# Patient Record
Sex: FEMALE | Race: WHITE | NOT HISPANIC OR LATINO | RURAL
[De-identification: names, ages, dates, MRNs, and addresses within clinical notes are randomized per-mention and may not be internally consistent; named-entity substitution may affect disease eponyms.]

---

## 2020-07-13 ENCOUNTER — HISTORICAL (OUTPATIENT)
Dept: ADMINISTRATIVE | Facility: HOSPITAL | Age: 35
End: 2020-07-13

## 2021-02-11 ENCOUNTER — HISTORICAL (OUTPATIENT)
Dept: ADMINISTRATIVE | Facility: HOSPITAL | Age: 36
End: 2021-02-11

## 2021-05-26 ENCOUNTER — OFFICE VISIT (OUTPATIENT)
Dept: OBSTETRICS AND GYNECOLOGY | Facility: CLINIC | Age: 36
End: 2021-05-26
Payer: MEDICAID

## 2021-05-26 VITALS
SYSTOLIC BLOOD PRESSURE: 130 MMHG | HEIGHT: 64 IN | DIASTOLIC BLOOD PRESSURE: 78 MMHG | WEIGHT: 185 LBS | TEMPERATURE: 98 F | OXYGEN SATURATION: 97 % | HEART RATE: 85 BPM | RESPIRATION RATE: 18 BRPM | BODY MASS INDEX: 31.58 KG/M2

## 2021-05-26 DIAGNOSIS — R10.2 PELVIC PAIN: ICD-10-CM

## 2021-05-26 DIAGNOSIS — Z12.4 ENCOUNTER FOR SCREENING FOR MALIGNANT NEOPLASM OF CERVIX: ICD-10-CM

## 2021-05-26 DIAGNOSIS — Z01.419 ENCOUNTER FOR GYNECOLOGICAL EXAMINATION WITHOUT ABNORMAL FINDING: Primary | ICD-10-CM

## 2021-05-26 DIAGNOSIS — N91.2 AMENORRHEA: ICD-10-CM

## 2021-05-26 DIAGNOSIS — F17.210 CIGARETTE SMOKER: ICD-10-CM

## 2021-05-26 DIAGNOSIS — N76.0 ACUTE VAGINITIS: ICD-10-CM

## 2021-05-26 LAB
B-HCG UR QL: NEGATIVE
CANDIDA SPECIES: NEGATIVE
CTP QC/QA: YES
GARDNERELLA: NEGATIVE
TRICHOMONAS: POSITIVE

## 2021-05-26 PROCEDURE — 99395 PREV VISIT EST AGE 18-39: CPT | Mod: 25,,, | Performed by: ADVANCED PRACTICE MIDWIFE

## 2021-05-26 PROCEDURE — 87491 CHLAMYDIA/GONORRHOEAE(GC), PCR: ICD-10-PCS | Mod: ,,, | Performed by: CLINICAL MEDICAL LABORATORY

## 2021-05-26 PROCEDURE — 87624 HUMAN PAPILLOMAVIRUS (HPV): ICD-10-PCS | Mod: ,,, | Performed by: CLINICAL MEDICAL LABORATORY

## 2021-05-26 PROCEDURE — 87660 TRICHOMONAS VAGIN DIR PROBE: CPT | Mod: ,,, | Performed by: CLINICAL MEDICAL LABORATORY

## 2021-05-26 PROCEDURE — 99395 PR PREVENTIVE VISIT,EST,18-39: ICD-10-PCS | Mod: 25,,, | Performed by: ADVANCED PRACTICE MIDWIFE

## 2021-05-26 PROCEDURE — 88142 CYTOPATH C/V THIN LAYER: CPT | Mod: GCY | Performed by: ADVANCED PRACTICE MIDWIFE

## 2021-05-26 PROCEDURE — 87480 BACTERIAL VAGINOSIS: ICD-10-PCS | Mod: ,,, | Performed by: CLINICAL MEDICAL LABORATORY

## 2021-05-26 PROCEDURE — 87660 BACTERIAL VAGINOSIS: ICD-10-PCS | Mod: ,,, | Performed by: CLINICAL MEDICAL LABORATORY

## 2021-05-26 PROCEDURE — 87491 CHLMYD TRACH DNA AMP PROBE: CPT | Mod: ,,, | Performed by: CLINICAL MEDICAL LABORATORY

## 2021-05-26 PROCEDURE — 87510 BACTERIAL VAGINOSIS: ICD-10-PCS | Mod: ,,, | Performed by: CLINICAL MEDICAL LABORATORY

## 2021-05-26 PROCEDURE — 87624 HPV HI-RISK TYP POOLED RSLT: CPT | Mod: ,,, | Performed by: CLINICAL MEDICAL LABORATORY

## 2021-05-26 PROCEDURE — 81025 POCT URINE PREGNANCY: ICD-10-PCS | Mod: ,,, | Performed by: ADVANCED PRACTICE MIDWIFE

## 2021-05-26 PROCEDURE — 96372 THER/PROPH/DIAG INJ SC/IM: CPT | Mod: ,,, | Performed by: ADVANCED PRACTICE MIDWIFE

## 2021-05-26 PROCEDURE — 87591 N.GONORRHOEAE DNA AMP PROB: CPT | Mod: ,,, | Performed by: CLINICAL MEDICAL LABORATORY

## 2021-05-26 PROCEDURE — 87591 CHLAMYDIA/GONORRHOEAE(GC), PCR: ICD-10-PCS | Mod: ,,, | Performed by: CLINICAL MEDICAL LABORATORY

## 2021-05-26 PROCEDURE — 81025 URINE PREGNANCY TEST: CPT | Mod: ,,, | Performed by: ADVANCED PRACTICE MIDWIFE

## 2021-05-26 PROCEDURE — 96372 PR INJECTION,THERAP/PROPH/DIAG2ST, IM OR SUBCUT: ICD-10-PCS | Mod: ,,, | Performed by: ADVANCED PRACTICE MIDWIFE

## 2021-05-26 PROCEDURE — 87480 CANDIDA DNA DIR PROBE: CPT | Mod: ,,, | Performed by: CLINICAL MEDICAL LABORATORY

## 2021-05-26 PROCEDURE — 87510 GARDNER VAG DNA DIR PROBE: CPT | Mod: ,,, | Performed by: CLINICAL MEDICAL LABORATORY

## 2021-05-26 RX ORDER — FLUOXETINE HYDROCHLORIDE 40 MG/1
40 CAPSULE ORAL DAILY
COMMUNITY

## 2021-05-26 RX ORDER — BREXPIPRAZOLE 4 MG/1
4 TABLET ORAL DAILY
COMMUNITY

## 2021-05-26 RX ORDER — CLONAZEPAM 0.5 MG/1
0.5 TABLET ORAL 2 TIMES DAILY
COMMUNITY

## 2021-05-26 RX ORDER — METRONIDAZOLE 500 MG/1
500 TABLET ORAL 2 TIMES DAILY
Qty: 14 TABLET | Refills: 0 | Status: SHIPPED | OUTPATIENT
Start: 2021-05-26 | End: 2021-06-09

## 2021-05-26 RX ORDER — FLUCONAZOLE 100 MG/1
100 TABLET ORAL DAILY
Qty: 3 TABLET | Refills: 0 | Status: SHIPPED | OUTPATIENT
Start: 2021-05-26 | End: 2021-05-29

## 2021-05-26 RX ORDER — AMITRIPTYLINE HYDROCHLORIDE 100 MG/1
100 TABLET ORAL NIGHTLY
COMMUNITY

## 2021-05-26 RX ORDER — DOXYCYCLINE 100 MG/1
100 CAPSULE ORAL 2 TIMES DAILY
Qty: 20 CAPSULE | Refills: 0 | Status: SHIPPED | OUTPATIENT
Start: 2021-05-26 | End: 2021-06-05

## 2021-05-26 RX ORDER — AZITHROMYCIN 500 MG/1
1000 TABLET, FILM COATED ORAL ONCE
Qty: 2 TABLET | Refills: 0 | Status: SHIPPED | OUTPATIENT
Start: 2021-05-26 | End: 2021-05-26

## 2021-05-27 LAB
CHLAMYDIA BY PCR: NEGATIVE
N. GONORRHOEAE (GC) BY PCR: NEGATIVE

## 2021-05-28 LAB
GH SERPL-MCNC: NORMAL NG/ML
INSULIN SERPL-ACNC: NORMAL U[IU]/ML
LAB AP CLINICAL INFORMATION: NORMAL
LAB AP GYN INTERPRETATION: NEGATIVE
LAB AP PAP DISCLAIMER COMMENTS: NORMAL
RENIN PLAS-CCNC: NORMAL NG/ML/H

## 2021-06-01 LAB
HPV 16: NEGATIVE
HPV 18: NEGATIVE
HPV OTHER: NEGATIVE

## 2022-01-06 ENCOUNTER — OFFICE VISIT (OUTPATIENT)
Dept: FAMILY MEDICINE | Facility: CLINIC | Age: 37
End: 2022-01-06
Payer: MEDICAID

## 2022-01-06 DIAGNOSIS — R05.8 COUGH WITH EXPOSURE TO COVID-19 VIRUS: Primary | ICD-10-CM

## 2022-01-06 DIAGNOSIS — Z20.822 COUGH WITH EXPOSURE TO COVID-19 VIRUS: Primary | ICD-10-CM

## 2022-01-06 LAB
CTP QC/QA: YES
FLUAV AG NPH QL: NEGATIVE
FLUBV AG NPH QL: NEGATIVE
SARS-COV-2 AG RESP QL IA.RAPID: POSITIVE

## 2022-01-06 PROCEDURE — 99213 OFFICE O/P EST LOW 20 MIN: CPT | Mod: GC,,, | Performed by: FAMILY MEDICINE

## 2022-01-06 PROCEDURE — 99213 PR OFFICE/OUTPT VISIT, EST, LEVL III, 20-29 MIN: ICD-10-PCS | Mod: GC,,, | Performed by: FAMILY MEDICINE

## 2022-01-06 PROCEDURE — 87428 SARSCOV & INF VIR A&B AG IA: CPT | Mod: RHCUB | Performed by: FAMILY MEDICINE

## 2022-01-06 NOTE — PROGRESS NOTES
Subjective:       Patient ID: Randee Jackson is a 36 y.o. female.    Chief Complaint: No chief complaint on file.      36 y.o. patient who presents to [facility] for COVID-19 testing.     Patients is vaccinated  no  Patient has symptoms yes  Onset: 1/3/22  Known COVID-19 exposure: yes  Tobacco use: Yes, describe: Cigarettes  PMH: None    Review of Systems   Constitutional: Positive for fatigue.   HENT: Positive for nasal congestion, sneezing and sore throat.    Eyes: Positive for discharge and redness.   Respiratory: Positive for cough.    Neurological: Positive for headaches.   All other systems reviewed and are negative.          Objective:      Physical Exam  Constitutional:       General: She is not in acute distress.     Appearance: Normal appearance.   HENT:      Nose: Nose normal.      Mouth/Throat:      Mouth: Mucous membranes are moist.   Cardiovascular:      Rate and Rhythm: Normal rate and regular rhythm.      Pulses: Normal pulses.      Heart sounds: No murmur heard.      Pulmonary:      Effort: Pulmonary effort is normal.      Breath sounds: Normal breath sounds.   Skin:     General: Skin is warm.   Neurological:      Mental Status: She is alert.            Assessment:       1. Cough with exposure to COVID-19 virus        Plan:    Patient states she tested +ve on Monday. Records not available in our system. Wants rapid test today so that can return to work on Monday. Explained that the test will be positive as she is actively symptomatic. Patient insisted we still test her.     COVID-19 TESTING  -- Rapid COVID test performed  -- patient provided with educational material   -- patient given instructions for proper home monitoring and isolation procedures  -- patient instructed to notify clinic of any changes, report to ED for evaluation of any new or worsening chest pain, shortness of breath, or other severe symptoms  -- will follow patient with telemedicine as appropriate    Recommended  quarantine/isolation: 5 days followed by 5 days of masking

## 2022-02-21 ENCOUNTER — OFFICE VISIT (OUTPATIENT)
Dept: OBSTETRICS AND GYNECOLOGY | Facility: CLINIC | Age: 37
End: 2022-02-21
Payer: MEDICAID

## 2022-02-21 VITALS
SYSTOLIC BLOOD PRESSURE: 130 MMHG | WEIGHT: 194 LBS | BODY MASS INDEX: 33.12 KG/M2 | DIASTOLIC BLOOD PRESSURE: 68 MMHG | RESPIRATION RATE: 16 BRPM | HEIGHT: 64 IN

## 2022-02-21 DIAGNOSIS — B96.89 BV (BACTERIAL VAGINOSIS): ICD-10-CM

## 2022-02-21 DIAGNOSIS — R10.2 PELVIC PAIN: Primary | ICD-10-CM

## 2022-02-21 DIAGNOSIS — N76.0 BV (BACTERIAL VAGINOSIS): ICD-10-CM

## 2022-02-21 LAB
BILIRUB SERPL-MCNC: NEGATIVE MG/DL
BLOOD URINE, POC: NEGATIVE
COLOR, POC UA: NORMAL
GARDNERELLA VAGINALIS: POSITIVE
GLUCOSE UR QL STRIP: NEGATIVE
KETONES UR QL STRIP: NEGATIVE
LEUKOCYTE ESTERASE URINE, POC: NEGATIVE
NITRITE, POC UA: NEGATIVE
PH, POC UA: NORMAL
POC BACTERIAL VAGINOSIS: POSITIVE
PROTEIN, POC: NEGATIVE
SPECIFIC GRAVITY, POC UA: NORMAL
TRICHOMONAS, POC: NEGATIVE
UROBILINOGEN, POC UA: NEGATIVE
YEAST WET PREP: NEGATIVE

## 2022-02-21 PROCEDURE — 3075F PR MOST RECENT SYSTOLIC BLOOD PRESS GE 130-139MM HG: ICD-10-PCS | Mod: CPTII,,, | Performed by: OBSTETRICS & GYNECOLOGY

## 2022-02-21 PROCEDURE — 87491 CHLAMYDIA/GONORRHOEAE(GC), PCR: ICD-10-PCS | Mod: ,,, | Performed by: CLINICAL MEDICAL LABORATORY

## 2022-02-21 PROCEDURE — 87591 CHLAMYDIA/GONORRHOEAE(GC), PCR: ICD-10-PCS | Mod: ,,, | Performed by: CLINICAL MEDICAL LABORATORY

## 2022-02-21 PROCEDURE — 81003 URINALYSIS AUTO W/O SCOPE: CPT | Mod: PBBFAC | Performed by: OBSTETRICS & GYNECOLOGY

## 2022-02-21 PROCEDURE — 3008F BODY MASS INDEX DOCD: CPT | Mod: CPTII,,, | Performed by: OBSTETRICS & GYNECOLOGY

## 2022-02-21 PROCEDURE — 99213 OFFICE O/P EST LOW 20 MIN: CPT | Mod: PBBFAC | Performed by: OBSTETRICS & GYNECOLOGY

## 2022-02-21 PROCEDURE — 3078F PR MOST RECENT DIASTOLIC BLOOD PRESSURE < 80 MM HG: ICD-10-PCS | Mod: CPTII,,, | Performed by: OBSTETRICS & GYNECOLOGY

## 2022-02-21 PROCEDURE — 87591 N.GONORRHOEAE DNA AMP PROB: CPT | Mod: ,,, | Performed by: CLINICAL MEDICAL LABORATORY

## 2022-02-21 PROCEDURE — 99214 PR OFFICE/OUTPT VISIT, EST, LEVL IV, 30-39 MIN: ICD-10-PCS | Mod: S$PBB,,, | Performed by: OBSTETRICS & GYNECOLOGY

## 2022-02-21 PROCEDURE — 3078F DIAST BP <80 MM HG: CPT | Mod: CPTII,,, | Performed by: OBSTETRICS & GYNECOLOGY

## 2022-02-21 PROCEDURE — 3008F PR BODY MASS INDEX (BMI) DOCUMENTED: ICD-10-PCS | Mod: CPTII,,, | Performed by: OBSTETRICS & GYNECOLOGY

## 2022-02-21 PROCEDURE — 87491 CHLMYD TRACH DNA AMP PROBE: CPT | Mod: ,,, | Performed by: CLINICAL MEDICAL LABORATORY

## 2022-02-21 PROCEDURE — 99214 OFFICE O/P EST MOD 30 MIN: CPT | Mod: S$PBB,,, | Performed by: OBSTETRICS & GYNECOLOGY

## 2022-02-21 PROCEDURE — 3075F SYST BP GE 130 - 139MM HG: CPT | Mod: CPTII,,, | Performed by: OBSTETRICS & GYNECOLOGY

## 2022-02-21 RX ORDER — BENZTROPINE MESYLATE 0.5 MG/1
0.5 TABLET ORAL 2 TIMES DAILY
COMMUNITY
Start: 2022-01-26

## 2022-02-21 RX ORDER — DULOXETIN HYDROCHLORIDE 60 MG/1
CAPSULE, DELAYED RELEASE ORAL
COMMUNITY
Start: 2022-02-10

## 2022-02-21 RX ORDER — RISPERIDONE 1 MG/1
1 TABLET ORAL 2 TIMES DAILY
COMMUNITY
Start: 2022-01-26

## 2022-02-22 LAB
CHLAMYDIA BY PCR: NEGATIVE
N. GONORRHOEAE (GC) BY PCR: NEGATIVE

## 2022-03-03 ENCOUNTER — OFFICE VISIT (OUTPATIENT)
Dept: OBSTETRICS AND GYNECOLOGY | Facility: CLINIC | Age: 37
End: 2022-03-03
Payer: MEDICAID

## 2022-03-03 ENCOUNTER — HOSPITAL ENCOUNTER (OUTPATIENT)
Dept: RADIOLOGY | Facility: HOSPITAL | Age: 37
Discharge: HOME | End: 2022-03-03
Attending: OBSTETRICS & GYNECOLOGY
Payer: MEDICAID

## 2022-03-03 DIAGNOSIS — R10.2 PELVIC PAIN: ICD-10-CM

## 2022-03-03 DIAGNOSIS — R10.2 PELVIC PAIN: Primary | ICD-10-CM

## 2022-03-03 PROCEDURE — 76856 US EXAM PELVIC COMPLETE: CPT | Mod: TC

## 2022-03-03 PROCEDURE — 76856 US PELVIS COMPLETE NON OB: ICD-10-PCS | Mod: 26,,, | Performed by: RADIOLOGY

## 2022-03-03 PROCEDURE — 99212 OFFICE O/P EST SF 10 MIN: CPT | Mod: S$PBB,,, | Performed by: OBSTETRICS & GYNECOLOGY

## 2022-03-03 PROCEDURE — 99212 PR OFFICE/OUTPT VISIT, EST, LEVL II, 10-19 MIN: ICD-10-PCS | Mod: S$PBB,,, | Performed by: OBSTETRICS & GYNECOLOGY

## 2022-03-03 PROCEDURE — 99212 OFFICE O/P EST SF 10 MIN: CPT | Mod: PBBFAC,25 | Performed by: OBSTETRICS & GYNECOLOGY

## 2022-03-03 PROCEDURE — 76856 US EXAM PELVIC COMPLETE: CPT | Mod: 26,,, | Performed by: RADIOLOGY

## 2022-05-20 RX ORDER — METRONIDAZOLE 500 MG/1
500 TABLET ORAL 2 TIMES DAILY
Qty: 14 TABLET | Refills: 1 | Status: SHIPPED | OUTPATIENT
Start: 2022-05-20

## 2022-05-20 NOTE — PROGRESS NOTES
Randee Jackson is a 36 y.o. female seen today for   Chief Complaint   Patient presents with    Follow-up     Discuss pelvic ultrasound results         SUBJECTIVE:   Patient gives a history of  chronic pelvic pain and recurrent bacterial vaginosis.  Pelvic ultrasound was ordered she is here today to discuss that pelvic ultrasound.  GC and chlamydia culture was obtained and was negative she states that the Flagyl cleared upper discharge but she continues to complain of pelvic pain.   The ultrasound was normal with no evidence of uterine fibroids or masses.  Patient requested that a hysterectomy be performed for her chronic pelvic pain. she does have a history of 2 prior  sections with a tubal ligation at the time of her last .  Her pelvic exam was unremarkable at her last visit 2 weeks ago and was not repeated at this visit.  Face-to-face discussion time was approximately 15 minutes  Past Medical History:   Diagnosis Date    Arthritis     Schizophrenia      Family History   Problem Relation Age of Onset    Lung cancer Father     Bone cancer Father     Heart disease Father     Hypertension Father     Cervical cancer Mother     Cervical cancer Sister     Stroke Paternal Grandmother     Eclampsia Maternal Grandfather      Past Surgical History:   Procedure Laterality Date     SECTION      2007    TUBAL LIGATION      by dr rodriguez      Current Outpatient Medications   Medication Sig Dispense Refill    amitriptyline (ELAVIL) 100 MG tablet Take 100 mg by mouth every evening.      baclofen (LIORESAL) 10 MG tablet TAKE 1 TABLET BY MOUTH 3 TIMES A DAY AS NEEDED .. may cause drowsiness / no alcohol / no driving      benztropine (COGENTIN) 0.5 MG tablet Take 0.5 mg by mouth 2 (two) times daily. as directed.      brexpiprazole (REXULTI) 4 mg Tab Take 4 mg by mouth once daily.      clonazePAM (KLONOPIN) 0.5 MG tablet Take 0.5 mg by mouth 2 (two) times daily.      DULoxetine  (CYMBALTA) 60 MG capsule TAKE 1 CAPSULE BY MOUTH AT BEDTIME ..MAY CAUSE DROWSINESS      FLUoxetine 40 MG capsule Take 40 mg by mouth once daily.      gabapentin (NEURONTIN) 600 MG tablet Take 600 mg by mouth 3 (three) times daily.      metroNIDAZOLE (FLAGYL) 500 MG tablet Take 1 tablet (500 mg total) by mouth 2 (two) times a day. 14 tablet 1    risperiDONE (RISPERDAL) 1 MG tablet Take 1 mg by mouth 2 (two) times daily. as directed.       No current facility-administered medications for this visit.     Allergies: Patient has no known allergies.   Patient's last menstrual period was 02/10/2022.  Review of Systems  Not done    OBJECTIVE:   LMP 02/10/2022 Comment: x5 days;   Physical Exam Not done      ASSESSMENT/PLAN:  Pelvic pain     Patient is to take Motrin 800 mg every 8 hours p.r.n. for pain and to keep a pain diary.  She should bring that diary with her at her next office visit in 3 months.

## 2022-05-20 NOTE — PROGRESS NOTES
Randee Jackson is a 36 y.o. female seen today for   Chief Complaint   Patient presents with    STD CHECK    Painful [place]     Pt states this is not a new problem but states she has never gotten an answer for what can be causing this    Gynecologic Exam     Last pap 2021; last mammo never         SUBJECTIVE:   As above  Past Medical History:   Diagnosis Date    Arthritis     Schizophrenia      Family History   Problem Relation Age of Onset    Lung cancer Father     Bone cancer Father     Heart disease Father     Hypertension Father     Cervical cancer Mother     Cervical cancer Sister     Stroke Paternal Grandmother     Eclampsia Maternal Grandfather      Past Surgical History:   Procedure Laterality Date     SECTION      2007    TUBAL LIGATION      by dr rodriguez      Current Outpatient Medications   Medication Sig Dispense Refill    amitriptyline (ELAVIL) 100 MG tablet Take 100 mg by mouth every evening.      benztropine (COGENTIN) 0.5 MG tablet Take 0.5 mg by mouth 2 (two) times daily. as directed.      clonazePAM (KLONOPIN) 0.5 MG tablet Take 0.5 mg by mouth 2 (two) times daily.      DULoxetine (CYMBALTA) 60 MG capsule TAKE 1 CAPSULE BY MOUTH AT BEDTIME ..MAY CAUSE DROWSINESS      FLUoxetine 40 MG capsule Take 40 mg by mouth once daily.      risperiDONE (RISPERDAL) 1 MG tablet Take 1 mg by mouth 2 (two) times daily. as directed.      baclofen (LIORESAL) 10 MG tablet TAKE 1 TABLET BY MOUTH 3 TIMES A DAY AS NEEDED .. may cause drowsiness / no alcohol / no driving      brexpiprazole (REXULTI) 4 mg Tab Take 4 mg by mouth once daily.      gabapentin (NEURONTIN) 600 MG tablet Take 600 mg by mouth 3 (three) times daily.      metroNIDAZOLE (FLAGYL) 500 MG tablet Take 1 tablet (500 mg total) by mouth 2 (two) times a day. 14 tablet 1     No current facility-administered medications for this visit.     Allergies: Patient has no known allergies.   Patient's last  "menstrual period was 02/10/2022.  Review of Systems   Gastrointestinal: Negative for abdominal pain, change in bowel habit, constipation and change in bowel habit.   Genitourinary: Positive for dyspareunia, pelvic pain and vaginal discharge. Negative for difficulty urinating, dysuria, enuresis, flank pain, frequency, hot flashes and urgency.   All other systems reviewed and are negative.       OBJECTIVE:   /68   Resp 16   Ht 5' 4" (1.626 m)   Wt 88 kg (194 lb)   LMP 02/10/2022 Comment: x5 days;   BMI 33.30 kg/m²   Physical Exam  Vitals and nursing note reviewed. Exam conducted with a chaperone present.   Constitutional:       General: She is not in acute distress.     Appearance: She is obese. She is not ill-appearing.   Cardiovascular:      Rate and Rhythm: Normal rate and regular rhythm.   Pulmonary:      Effort: Pulmonary effort is normal.      Breath sounds: Normal breath sounds.   Abdominal:      General: Abdomen is flat.      Palpations: Abdomen is soft.      Tenderness: There is no rebound.   Genitourinary:     General: Normal vulva.      Exam position: Lithotomy position.      Labia:         Right: No rash.         Left: No rash.       Vagina: Vaginal discharge present.      Cervix: Normal.      Uterus: Normal.       Adnexa:         Right: No mass or tenderness.          Left: No mass or tenderness.        Comments: Wet prep- + clue  Psychiatric:         Mood and Affect: Mood normal.            ASSESSMENT/PLAN:  Pelvic pain  -     Chlamydia/GC, PCR  -     Cancel: Treponema Pallidum (Syphillis) Antibody; Future; Expected date: 02/21/2022  -     Cancel: HIV 1/2 Ag/Ab (4th Gen); Future; Expected date: 02/21/2022  -     Cancel: Hepatitis B Surface Antigen; Future; Expected date: 02/21/2022  -     POCT URINALYSIS W/O SCOPE  -     US Pelvis Complete Non OB; Future; Expected date: 03/03/2022    BV (bacterial vaginosis)  -     POCT WET PREP    Other orders  -     metroNIDAZOLE (FLAGYL) 500 MG tablet; Take " 1 tablet (500 mg total) by mouth 2 (two) times a day.  Dispense: 14 tablet; Refill: 1

## 2022-08-01 ENCOUNTER — HOSPITAL ENCOUNTER (EMERGENCY)
Facility: HOSPITAL | Age: 37
Discharge: HOME | End: 2022-08-01
Attending: EMERGENCY MEDICINE
Payer: MEDICAID

## 2022-08-01 VITALS
WEIGHT: 215 LBS | HEART RATE: 114 BPM | BODY MASS INDEX: 36.7 KG/M2 | DIASTOLIC BLOOD PRESSURE: 92 MMHG | TEMPERATURE: 99 F | OXYGEN SATURATION: 95 % | SYSTOLIC BLOOD PRESSURE: 131 MMHG | HEIGHT: 64 IN | RESPIRATION RATE: 17 BRPM

## 2022-08-01 DIAGNOSIS — Z04.1 EXAM FOLLOWING MVC (MOTOR VEHICLE COLLISION), NO APPARENT INJURY: ICD-10-CM

## 2022-08-01 DIAGNOSIS — S99.919A ANKLE INJURY: ICD-10-CM

## 2022-08-01 DIAGNOSIS — S92.101A CLOSED NONDISPLACED FRACTURE OF RIGHT TALUS, UNSPECIFIED PORTION OF TALUS, INITIAL ENCOUNTER: Primary | ICD-10-CM

## 2022-08-01 PROCEDURE — 99284 PR EMERGENCY DEPT VISIT,LEVEL IV: ICD-10-PCS | Mod: ,,, | Performed by: EMERGENCY MEDICINE

## 2022-08-01 PROCEDURE — 99285 EMERGENCY DEPT VISIT HI MDM: CPT | Mod: 25

## 2022-08-01 PROCEDURE — 99284 EMERGENCY DEPT VISIT MOD MDM: CPT | Mod: ,,, | Performed by: EMERGENCY MEDICINE

## 2022-08-01 PROCEDURE — 96374 THER/PROPH/DIAG INJ IV PUSH: CPT

## 2022-08-01 PROCEDURE — 63600175 PHARM REV CODE 636 W HCPCS: Performed by: EMERGENCY MEDICINE

## 2022-08-01 PROCEDURE — 96375 TX/PRO/DX INJ NEW DRUG ADDON: CPT

## 2022-08-01 RX ORDER — ONDANSETRON 2 MG/ML
4 INJECTION INTRAMUSCULAR; INTRAVENOUS
Status: COMPLETED | OUTPATIENT
Start: 2022-08-01 | End: 2022-08-01

## 2022-08-01 RX ORDER — MORPHINE SULFATE 4 MG/ML
4 INJECTION, SOLUTION INTRAMUSCULAR; INTRAVENOUS
Status: COMPLETED | OUTPATIENT
Start: 2022-08-01 | End: 2022-08-01

## 2022-08-01 RX ADMIN — MORPHINE SULFATE 4 MG: 4 INJECTION INTRAVENOUS at 08:08

## 2022-08-01 RX ADMIN — ONDANSETRON 4 MG: 2 INJECTION INTRAMUSCULAR; INTRAVENOUS at 08:08
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
## 2022-08-02 ENCOUNTER — OFFICE VISIT (OUTPATIENT)
Dept: ORTHOPEDICS | Facility: CLINIC | Age: 37
End: 2022-08-02
Payer: MEDICAID

## 2022-08-02 DIAGNOSIS — S92.101A CLOSED NONDISPLACED FRACTURE OF RIGHT TALUS, UNSPECIFIED PORTION OF TALUS, INITIAL ENCOUNTER: ICD-10-CM

## 2022-08-02 PROCEDURE — 1160F RVW MEDS BY RX/DR IN RCRD: CPT | Mod: CPTII,,, | Performed by: ORTHOPAEDIC SURGERY

## 2022-08-02 PROCEDURE — 99204 OFFICE O/P NEW MOD 45 MIN: CPT | Mod: 57,,, | Performed by: ORTHOPAEDIC SURGERY

## 2022-08-02 PROCEDURE — 28430 CLTX TALUS FRACTURE W/O MNPJ: CPT | Mod: RT,,, | Performed by: ORTHOPAEDIC SURGERY

## 2022-08-02 PROCEDURE — 99204 PR OFFICE/OUTPT VISIT, NEW, LEVL IV, 45-59 MIN: ICD-10-PCS | Mod: 57,,, | Performed by: ORTHOPAEDIC SURGERY

## 2022-08-02 PROCEDURE — 1159F PR MEDICATION LIST DOCUMENTED IN MEDICAL RECORD: ICD-10-PCS | Mod: CPTII,,, | Performed by: ORTHOPAEDIC SURGERY

## 2022-08-02 PROCEDURE — 1160F PR REVIEW ALL MEDS BY PRESCRIBER/CLIN PHARMACIST DOCUMENTED: ICD-10-PCS | Mod: CPTII,,, | Performed by: ORTHOPAEDIC SURGERY

## 2022-08-02 PROCEDURE — 1159F MED LIST DOCD IN RCRD: CPT | Mod: CPTII,,, | Performed by: ORTHOPAEDIC SURGERY

## 2022-08-02 PROCEDURE — 28430 PR CLOSED RX TALUS FX: ICD-10-PCS | Mod: RT,,, | Performed by: ORTHOPAEDIC SURGERY

## 2022-08-02 NOTE — DISCHARGE INSTRUCTIONS
FOLLOW UP WITH DR KEVIN LOVING.  TAKE PAIN MEDICATION AS DIRECTED.  FOLLOW UP IF SYMPTOMS PERSIST OR WORSEN OR OTHERWISE AS NEEDED.
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
## 2022-08-03 ENCOUNTER — TELEPHONE (OUTPATIENT)
Dept: EMERGENCY MEDICINE | Facility: HOSPITAL | Age: 37
End: 2022-08-03
Payer: MEDICAID

## 2022-08-09 ENCOUNTER — HOSPITAL ENCOUNTER (OUTPATIENT)
Dept: RADIOLOGY | Facility: HOSPITAL | Age: 37
Discharge: HOME | End: 2022-08-09
Attending: ORTHOPAEDIC SURGERY
Payer: MEDICAID

## 2022-08-09 DIAGNOSIS — S92.101A CLOSED NONDISPLACED FRACTURE OF RIGHT TALUS, UNSPECIFIED PORTION OF TALUS, INITIAL ENCOUNTER: ICD-10-CM

## 2022-08-09 PROCEDURE — 73700 CT ANKLE (INCLUDING HINDFOOT) WITHOUT CONTRAST RIGHT: ICD-10-PCS | Mod: 26,RT,, | Performed by: RADIOLOGY

## 2022-08-09 PROCEDURE — 73700 CT LOWER EXTREMITY W/O DYE: CPT | Mod: 26,RT,, | Performed by: RADIOLOGY

## 2022-08-09 PROCEDURE — 73700 CT LOWER EXTREMITY W/O DYE: CPT | Mod: TC,RT

## 2022-08-11 ENCOUNTER — OFFICE VISIT (OUTPATIENT)
Dept: ORTHOPEDICS | Facility: CLINIC | Age: 37
End: 2022-08-11
Payer: MEDICAID

## 2022-08-11 DIAGNOSIS — M25.871 ANKLE IMPINGEMENT SYNDROME, RIGHT: ICD-10-CM

## 2022-08-11 DIAGNOSIS — Z01.818 PREPROCEDURAL EXAMINATION: Primary | ICD-10-CM

## 2022-08-11 DIAGNOSIS — S92.141A DISPLACED DOME FRACTURE OF RIGHT TALUS, INITIAL ENCOUNTER FOR CLOSED FRACTURE: Primary | ICD-10-CM

## 2022-08-11 PROCEDURE — 99024 PR POST-OP FOLLOW-UP VISIT: ICD-10-PCS | Mod: ,,, | Performed by: ORTHOPAEDIC SURGERY

## 2022-08-11 PROCEDURE — 1160F RVW MEDS BY RX/DR IN RCRD: CPT | Mod: CPTII,,, | Performed by: ORTHOPAEDIC SURGERY

## 2022-08-11 PROCEDURE — 99024 POSTOP FOLLOW-UP VISIT: CPT | Mod: ,,, | Performed by: ORTHOPAEDIC SURGERY

## 2022-08-11 PROCEDURE — 1159F PR MEDICATION LIST DOCUMENTED IN MEDICAL RECORD: ICD-10-PCS | Mod: CPTII,,, | Performed by: ORTHOPAEDIC SURGERY

## 2022-08-11 PROCEDURE — 1160F PR REVIEW ALL MEDS BY PRESCRIBER/CLIN PHARMACIST DOCUMENTED: ICD-10-PCS | Mod: CPTII,,, | Performed by: ORTHOPAEDIC SURGERY

## 2022-08-11 PROCEDURE — 1159F MED LIST DOCD IN RCRD: CPT | Mod: CPTII,,, | Performed by: ORTHOPAEDIC SURGERY

## 2022-08-11 RX ORDER — SODIUM CHLORIDE 9 MG/ML
INJECTION, SOLUTION INTRAVENOUS CONTINUOUS
Status: CANCELLED | OUTPATIENT
Start: 2022-08-11

## 2022-08-11 RX ORDER — OXYCODONE AND ACETAMINOPHEN 10; 325 MG/1; MG/1
1 TABLET ORAL EVERY 4 HOURS PRN
Qty: 30 TABLET | Refills: 0 | Status: SHIPPED | OUTPATIENT
Start: 2022-08-11 | End: 2022-08-26

## 2022-08-11 RX ORDER — MUPIROCIN 20 MG/G
OINTMENT TOPICAL
Status: CANCELLED | OUTPATIENT
Start: 2022-08-11
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
## 2022-08-17 ENCOUNTER — ANESTHESIA (OUTPATIENT)
Dept: SURGERY | Facility: HOSPITAL | Age: 37
End: 2022-08-17
Payer: MEDICAID

## 2022-08-17 ENCOUNTER — HOSPITAL ENCOUNTER (OUTPATIENT)
Facility: HOSPITAL | Age: 37
Discharge: HOME | End: 2022-08-17
Attending: ORTHOPAEDIC SURGERY | Admitting: ORTHOPAEDIC SURGERY
Payer: MEDICAID

## 2022-08-17 ENCOUNTER — ANESTHESIA EVENT (OUTPATIENT)
Dept: SURGERY | Facility: HOSPITAL | Age: 37
End: 2022-08-17
Payer: MEDICAID

## 2022-08-17 VITALS
OXYGEN SATURATION: 96 % | TEMPERATURE: 98 F | HEIGHT: 64 IN | DIASTOLIC BLOOD PRESSURE: 77 MMHG | BODY MASS INDEX: 35 KG/M2 | RESPIRATION RATE: 18 BRPM | WEIGHT: 205 LBS | HEART RATE: 104 BPM | SYSTOLIC BLOOD PRESSURE: 134 MMHG

## 2022-08-17 DIAGNOSIS — S92.141A DISPLACED DOME FRACTURE OF RIGHT TALUS, INITIAL ENCOUNTER FOR CLOSED FRACTURE: Primary | ICD-10-CM

## 2022-08-17 PROCEDURE — 27000510 HC BLANKET BAIR HUGGER ANY SIZE: Performed by: ANESTHESIOLOGY

## 2022-08-17 PROCEDURE — 71000033 HC RECOVERY, INTIAL HOUR: Performed by: ORTHOPAEDIC SURGERY

## 2022-08-17 PROCEDURE — D9220A PRA ANESTHESIA: ICD-10-PCS | Mod: ANES,,, | Performed by: ANESTHESIOLOGY

## 2022-08-17 PROCEDURE — D9220A PRA ANESTHESIA: ICD-10-PCS | Mod: CRNA,,, | Performed by: NURSE ANESTHETIST, CERTIFIED REGISTERED

## 2022-08-17 PROCEDURE — 27201423 OPTIME MED/SURG SUP & DEVICES STERILE SUPPLY: Performed by: ORTHOPAEDIC SURGERY

## 2022-08-17 PROCEDURE — 29891 ARTHR ANK OSTCHN DF TAL&/TIB: CPT | Mod: 78,RT,, | Performed by: ORTHOPAEDIC SURGERY

## 2022-08-17 PROCEDURE — 27100168 OPTIME MED/SURG SUP & DEVICES NON-STERILE SUPPLY: Performed by: ORTHOPAEDIC SURGERY

## 2022-08-17 PROCEDURE — 63600175 PHARM REV CODE 636 W HCPCS: Performed by: NURSE ANESTHETIST, CERTIFIED REGISTERED

## 2022-08-17 PROCEDURE — 25000003 PHARM REV CODE 250: Performed by: ORTHOPAEDIC SURGERY

## 2022-08-17 PROCEDURE — 63600175 PHARM REV CODE 636 W HCPCS: Performed by: ORTHOPAEDIC SURGERY

## 2022-08-17 PROCEDURE — 25000003 PHARM REV CODE 250: Performed by: NURSE ANESTHETIST, CERTIFIED REGISTERED

## 2022-08-17 PROCEDURE — 27000177 HC AIRWAY, LARYNGEAL MASK: Performed by: ANESTHESIOLOGY

## 2022-08-17 PROCEDURE — D9220A PRA ANESTHESIA: Mod: CRNA,,, | Performed by: NURSE ANESTHETIST, CERTIFIED REGISTERED

## 2022-08-17 PROCEDURE — 27000284 HC CANNULA NASAL: Performed by: ANESTHESIOLOGY

## 2022-08-17 PROCEDURE — 97161 PT EVAL LOW COMPLEX 20 MIN: CPT

## 2022-08-17 PROCEDURE — D9220A PRA ANESTHESIA: Mod: ANES,,, | Performed by: ANESTHESIOLOGY

## 2022-08-17 PROCEDURE — 36000708 HC OR TIME LEV III 1ST 15 MIN: Performed by: ORTHOPAEDIC SURGERY

## 2022-08-17 PROCEDURE — 37000008 HC ANESTHESIA 1ST 15 MINUTES: Performed by: ORTHOPAEDIC SURGERY

## 2022-08-17 PROCEDURE — 71000015 HC POSTOP RECOV 1ST HR: Performed by: ORTHOPAEDIC SURGERY

## 2022-08-17 PROCEDURE — 29891 PR ANKLE SCOPE,EXCIS OSTEOCHON DEFCT: ICD-10-PCS | Mod: 78,RT,, | Performed by: ORTHOPAEDIC SURGERY

## 2022-08-17 PROCEDURE — 01464 ANES ARTHRS PX ANKLE&/FOOT: CPT | Performed by: ORTHOPAEDIC SURGERY

## 2022-08-17 PROCEDURE — 27000655: Performed by: ANESTHESIOLOGY

## 2022-08-17 PROCEDURE — 36000709 HC OR TIME LEV III EA ADD 15 MIN: Performed by: ORTHOPAEDIC SURGERY

## 2022-08-17 PROCEDURE — 27000716 HC OXISENSOR PROBE, ANY SIZE: Performed by: ANESTHESIOLOGY

## 2022-08-17 PROCEDURE — 37000009 HC ANESTHESIA EA ADD 15 MINS: Performed by: ORTHOPAEDIC SURGERY

## 2022-08-17 RX ORDER — HYDROMORPHONE HYDROCHLORIDE 2 MG/ML
0.5 INJECTION, SOLUTION INTRAMUSCULAR; INTRAVENOUS; SUBCUTANEOUS EVERY 5 MIN PRN
Status: DISCONTINUED | OUTPATIENT
Start: 2022-08-17 | End: 2022-08-17 | Stop reason: HOSPADM

## 2022-08-17 RX ORDER — MUPIROCIN 20 MG/G
OINTMENT TOPICAL
Status: DISCONTINUED | OUTPATIENT
Start: 2022-08-17 | End: 2022-08-17 | Stop reason: HOSPADM

## 2022-08-17 RX ORDER — CEFAZOLIN SODIUM 2 G/50ML
2 SOLUTION INTRAVENOUS
Status: COMPLETED | OUTPATIENT
Start: 2022-08-17 | End: 2022-08-17

## 2022-08-17 RX ORDER — SODIUM CHLORIDE 0.9 % (FLUSH) 0.9 %
10 SYRINGE (ML) INJECTION
Status: DISCONTINUED | OUTPATIENT
Start: 2022-08-17 | End: 2022-08-17 | Stop reason: HOSPADM

## 2022-08-17 RX ORDER — MUPIROCIN 20 MG/G
OINTMENT TOPICAL 2 TIMES DAILY
Status: DISCONTINUED | OUTPATIENT
Start: 2022-08-17 | End: 2022-08-17 | Stop reason: HOSPADM

## 2022-08-17 RX ORDER — MIDAZOLAM HYDROCHLORIDE 1 MG/ML
INJECTION INTRAMUSCULAR; INTRAVENOUS
Status: DISCONTINUED | OUTPATIENT
Start: 2022-08-17 | End: 2022-08-17

## 2022-08-17 RX ORDER — LIDOCAINE HYDROCHLORIDE 20 MG/ML
INJECTION, SOLUTION EPIDURAL; INFILTRATION; INTRACAUDAL; PERINEURAL
Status: DISCONTINUED | OUTPATIENT
Start: 2022-08-17 | End: 2022-08-17

## 2022-08-17 RX ORDER — SODIUM CHLORIDE 9 MG/ML
INJECTION, SOLUTION INTRAVENOUS CONTINUOUS
Status: DISCONTINUED | OUTPATIENT
Start: 2022-08-17 | End: 2022-08-17 | Stop reason: HOSPADM

## 2022-08-17 RX ORDER — FENTANYL CITRATE 50 UG/ML
INJECTION, SOLUTION INTRAMUSCULAR; INTRAVENOUS
Status: DISCONTINUED | OUTPATIENT
Start: 2022-08-17 | End: 2022-08-17

## 2022-08-17 RX ORDER — PROPOFOL 10 MG/ML
INJECTION, EMULSION INTRAVENOUS
Status: DISCONTINUED | OUTPATIENT
Start: 2022-08-17 | End: 2022-08-17

## 2022-08-17 RX ORDER — HYDROMORPHONE HYDROCHLORIDE 2 MG/ML
1 INJECTION, SOLUTION INTRAMUSCULAR; INTRAVENOUS; SUBCUTANEOUS EVERY 4 HOURS PRN
Status: DISCONTINUED | OUTPATIENT
Start: 2022-08-17 | End: 2022-08-17 | Stop reason: HOSPADM

## 2022-08-17 RX ORDER — OXYCODONE AND ACETAMINOPHEN 5; 325 MG/1; MG/1
1 TABLET ORAL EVERY 6 HOURS PRN
Qty: 25 TABLET | Refills: 0 | Status: SHIPPED | OUTPATIENT
Start: 2022-08-17 | End: 2022-08-26

## 2022-08-17 RX ORDER — MORPHINE SULFATE 10 MG/ML
4 INJECTION INTRAMUSCULAR; INTRAVENOUS; SUBCUTANEOUS EVERY 5 MIN PRN
Status: DISCONTINUED | OUTPATIENT
Start: 2022-08-17 | End: 2022-08-17 | Stop reason: HOSPADM

## 2022-08-17 RX ORDER — IBUPROFEN 600 MG/1
600 TABLET ORAL EVERY 6 HOURS PRN
Status: DISCONTINUED | OUTPATIENT
Start: 2022-08-17 | End: 2022-08-17 | Stop reason: HOSPADM

## 2022-08-17 RX ORDER — EPINEPHRINE CONVENIENCE KIT 1 MG/ML(1)
KIT INTRAMUSCULAR; SUBCUTANEOUS
Status: DISCONTINUED | OUTPATIENT
Start: 2022-08-17 | End: 2022-08-17 | Stop reason: HOSPADM

## 2022-08-17 RX ORDER — BUPIVACAINE HYDROCHLORIDE 2.5 MG/ML
INJECTION, SOLUTION EPIDURAL; INFILTRATION; INTRACAUDAL
Status: DISCONTINUED | OUTPATIENT
Start: 2022-08-17 | End: 2022-08-17 | Stop reason: HOSPADM

## 2022-08-17 RX ORDER — DIPHENHYDRAMINE HYDROCHLORIDE 50 MG/ML
25 INJECTION INTRAMUSCULAR; INTRAVENOUS EVERY 6 HOURS PRN
Status: DISCONTINUED | OUTPATIENT
Start: 2022-08-17 | End: 2022-08-17 | Stop reason: HOSPADM

## 2022-08-17 RX ORDER — ONDANSETRON 4 MG/1
8 TABLET, ORALLY DISINTEGRATING ORAL EVERY 8 HOURS PRN
Status: DISCONTINUED | OUTPATIENT
Start: 2022-08-17 | End: 2022-08-17 | Stop reason: HOSPADM

## 2022-08-17 RX ORDER — MEPERIDINE HYDROCHLORIDE 25 MG/ML
25 INJECTION INTRAMUSCULAR; INTRAVENOUS; SUBCUTANEOUS ONCE AS NEEDED
Status: DISCONTINUED | OUTPATIENT
Start: 2022-08-17 | End: 2022-08-17 | Stop reason: HOSPADM

## 2022-08-17 RX ORDER — DEXAMETHASONE SODIUM PHOSPHATE 4 MG/ML
INJECTION, SOLUTION INTRA-ARTICULAR; INTRALESIONAL; INTRAMUSCULAR; INTRAVENOUS; SOFT TISSUE
Status: DISCONTINUED | OUTPATIENT
Start: 2022-08-17 | End: 2022-08-17

## 2022-08-17 RX ORDER — HYDROMORPHONE HYDROCHLORIDE 2 MG/ML
INJECTION, SOLUTION INTRAMUSCULAR; INTRAVENOUS; SUBCUTANEOUS
Status: DISCONTINUED | OUTPATIENT
Start: 2022-08-17 | End: 2022-08-17

## 2022-08-17 RX ORDER — ONDANSETRON 4 MG/1
8 TABLET, ORALLY DISINTEGRATING ORAL EVERY 8 HOURS PRN
Qty: 30 TABLET | Refills: 0 | Status: SHIPPED | OUTPATIENT
Start: 2022-08-17

## 2022-08-17 RX ORDER — ONDANSETRON 2 MG/ML
4 INJECTION INTRAMUSCULAR; INTRAVENOUS DAILY PRN
Status: DISCONTINUED | OUTPATIENT
Start: 2022-08-17 | End: 2022-08-17 | Stop reason: HOSPADM

## 2022-08-17 RX ORDER — ONDANSETRON 2 MG/ML
INJECTION INTRAMUSCULAR; INTRAVENOUS
Status: DISCONTINUED | OUTPATIENT
Start: 2022-08-17 | End: 2022-08-17

## 2022-08-17 RX ORDER — HYDROCODONE BITARTRATE AND ACETAMINOPHEN 5; 325 MG/1; MG/1
1 TABLET ORAL EVERY 4 HOURS PRN
Status: DISCONTINUED | OUTPATIENT
Start: 2022-08-17 | End: 2022-08-17 | Stop reason: HOSPADM

## 2022-08-17 RX ADMIN — HYDROCODONE BITARTRATE AND ACETAMINOPHEN 1 TABLET: 5; 325 TABLET ORAL at 04:08

## 2022-08-17 RX ADMIN — CEFAZOLIN SODIUM 2 G: 1 INJECTION, POWDER, FOR SOLUTION INTRAMUSCULAR; INTRAVENOUS at 02:08

## 2022-08-17 RX ADMIN — HYDROMORPHONE HYDROCHLORIDE 1 MG: 2 INJECTION, SOLUTION INTRAMUSCULAR; INTRAVENOUS; SUBCUTANEOUS at 04:08

## 2022-08-17 RX ADMIN — FENTANYL CITRATE 100 MCG: 50 INJECTION INTRAMUSCULAR; INTRAVENOUS at 03:08

## 2022-08-17 RX ADMIN — PROPOFOL 200 MG: 10 INJECTION, EMULSION INTRAVENOUS at 02:08

## 2022-08-17 RX ADMIN — ONDANSETRON 8 MG: 2 INJECTION INTRAMUSCULAR; INTRAVENOUS at 03:08

## 2022-08-17 RX ADMIN — LIDOCAINE HYDROCHLORIDE 40 MG: 20 INJECTION, SOLUTION INTRAVENOUS at 02:08

## 2022-08-17 RX ADMIN — MIDAZOLAM HYDROCHLORIDE 2 MG: 1 INJECTION, SOLUTION INTRAMUSCULAR; INTRAVENOUS at 02:08

## 2022-08-17 RX ADMIN — HYDROMORPHONE HYDROCHLORIDE 1 MG: 2 INJECTION, SOLUTION INTRAMUSCULAR; INTRAVENOUS; SUBCUTANEOUS at 03:08

## 2022-08-17 RX ADMIN — SODIUM CHLORIDE: 9 INJECTION, SOLUTION INTRAVENOUS at 11:08

## 2022-08-17 RX ADMIN — FENTANYL CITRATE 100 MCG: 50 INJECTION INTRAMUSCULAR; INTRAVENOUS at 02:08

## 2022-08-17 NOTE — PLAN OF CARE
Problem: Physical Therapy  Goal: Physical Therapy Goal  Description: Short Term Goals  Independent with HEP  Independent with crutchesx 10 feet NWB: right lower extremity    Long term goals  Needed equipment for home.       Outcome: Met

## 2022-08-17 NOTE — ANESTHESIA PREPROCEDURE EVALUATION
08/17/2022  Randee Jackson is a 36 y.o., female.      Pre-op Assessment    I have reviewed the Patient Summary Reports.    I have reviewed the NPO Status.   I have reviewed the Medications.     Review of Systems  Anesthesia Hx:  No problems with previous Anesthesia  Denies Family Hx of Anesthesia complications.   Denies Personal Hx of Anesthesia complications.   Social:  No Alcohol Use, Smoker    Hematology/Oncology:  Hematology Normal   Oncology Normal     EENT/Dental:EENT/Dental Normal   Cardiovascular:  Cardiovascular Normal Exercise tolerance: good     Pulmonary:   Asthma mild    Renal/:  Renal/ Normal     Hepatic/GI:  Hepatic/GI Normal    Musculoskeletal:  Musculoskeletal Normal    Neurological:   Seizures, well controlled    Endocrine:  Endocrine Normal  Obesity / BMI > 30  Dermatological:  Skin Normal    Psych:   Psychiatric History schizophrenia         Physical Exam  General: Well nourished, Cooperative and Alert    Airway:  Mallampati: III   Mouth Opening: Normal  TM Distance: Normal  Tongue: Normal  Neck ROM: Normal ROM    Dental:  Intact    Chest/Lungs:  Clear to auscultation    Heart:  Rate: Normal  Rhythm: Regular Rhythm        Chemistry    No results found for: NA, K, CL, CO2, BUN, CREATININE, GLU No results found for: CALCIUM, ALKPHOS, AST, ALT, BILITOT, ESTGFRAFRICA, EGFRNONAA   No results found for: WBC, RBC, HGB, MCV, MCH, MCHC, RDW, PLT, MPV, GRAN, LYMPH, MONO, EOS, BASO    No results found for this or any previous visit.      Anesthesia Plan  Type of Anesthesia, risks & benefits discussed:    Anesthesia Type: Gen Supraglottic Airway  Intra-op Monitoring Plan: Standard ASA Monitors  Post Op Pain Control Plan: multimodal analgesia  Induction:  IV  Airway Plan: Direct, Post-Induction  Informed Consent: Informed consent signed with the Patient and all parties understand the risks and  agree with anesthesia plan.  All questions answered.   ASA Score: 2  Day of Surgery Review of History & Physical: I have interviewed and examined the patient. I have reviewed the patient's H&P dated: There are no significant changes.     Ready For Surgery From Anesthesia Perspective.     .

## 2022-08-17 NOTE — PLAN OF CARE
Problem: Physical Therapy  Goal: Physical Therapy Goal  Description: Short Term Goals  Independent with HEP  Independent with crutchesx 10 feet NWB: right lower extremity    Long term goals  Needed equipment for home.       Outcome: Met     Problem: Physical Therapy  Goal: Physical Therapy Goal  Description: Short Term Goals  Independent with HEP  Independent with crutchesx 10 feet NWB: right lower extremity    Long term goals  Needed equipment for home.       Outcome: Met

## 2022-08-17 NOTE — PLAN OF CARE
1609 pt to pacu pt resp reg and non labored pt dsg d/i to r knee pt sao2 94% on 2l nbp pt voices no complaints at present toes to r foot with good color cap refill less than 3 sec will monitor     1630  Pt vs stable pt resting well pt states r ankle sore pt sao2 92% on 2l nbp unable to give narcotis at present       1639 pt vs stable pt resting well orders to release to room per md    1640 pt released to yazan pearl rn b/p 141/81 pulse 102 resp 16 sao2 93% on 2l nbp

## 2022-08-17 NOTE — OP NOTE
[facility] - Orthopedic Periop Services  Operative Note    Surgery Date: 8/17/2022      Surgeon(s) and Role:     * Kilo Ambrosio MD - Primary    Assistant: none    Pre-op Diagnosis:   Displaced dome fracture of right talus, initial encounter for closed fracture [S92.141A]     Post-op Diagnosis:  Post-Op Diagnosis Codes:     * Displaced dome fracture of right talus, initial encounter for closed fracture [S92.141A]     Procedure:  Procedure(s) (LRB):  REMOVAL, LOOSE BODY, JOINT, ARTHROSCOPIC, ANKLE (Right)  MICROFRACTURE (Right) of the osteochondral defect of the lateral aspect talus     Anesthesia:  General    EBL:  * No values recorded between 8/17/2022  3:11 PM and 8/17/2022  3:54 PM *     Implants: * No implants in log *    Tourniquet time: 37 mins    Complication:   none    Procedure: The patient was taken to the operating room and placedsupine.  Anesthesia was administered and pre-operative antibiotics were given.  A timeout was performed.  Patient was positioned appropriately and prepped and draped in the usual sterile fashion.    The tourniquet was taken up to 250 mmHg.  The leg was placed in an ankle distractor and 20 cc of fluid was injected through an anterior lateral portal into the ankle joint.  Small percutaneous incision was made anterior laterally and the Arthrex nanoscope was inserted in the joint and a diagnostic arthroscopy was performed.  And osteochondral defect involving the lateral body of the talus was immediately encountered.  This was roughly 1.5 x 1 cm in size and was adjacent to the shoulder of the talus.  The corresponding osteochondral fragment was identified within the lateral gutter of the ankle and removed after establishing an anterior medial portal.  There was no further defects noted within the ankle after diagnostic arthroscopy was complete.  The defect was debrided with a motorized shaver and a microfracture pick was inserted in order to create microfracture perforations spaced  roughly 3 mm apart until bleeding fascia layer channels were able to be identified for future healing.  This completed the procedure.  The arthroscopic instruments were removed and the portals were closed with 3 O nylon in the portal sites injected with 5 cc of 0.25% bupivacaine.    Tourniquet was taken down.Sterile dressings were applied a posterior slab splint with stirrups was then placed.    Disposition:awakened from anesthesia, extubated and taken to the recovery room in a stable condition, having suffered no apparent untoward event.    NWB, splint immobilization for 2 weeks then transfer to [facility]

## 2022-08-17 NOTE — TRANSFER OF CARE
"Anesthesia Transfer of Care Note    Patient: Randee Jackson    Procedure(s) Performed: Procedure(s) (LRB):  REMOVAL, LOOSE BODY, JOINT, ARTHROSCOPIC, ANKLE (Right)  MICROFRACTURE (Right)    Patient location: PACU    Anesthesia Type: general    Transport from OR: Transported from OR on 2-3 L/min O2 by NC with adequate spontaneous ventilation    Post pain: adequate analgesia    Post assessment: no apparent anesthetic complications and tolerated procedure well    Post vital signs: stable    Level of consciousness: alert, oriented and awake    Nausea/Vomiting: no nausea/vomiting    Complications: none    Transfer of care protocol was followed      Last vitals:   Visit Vitals  /74 (BP Location: Right arm, Patient Position: Lying)   Pulse (!) 118   Temp 36.6 °C (97.9 °F) (Oral)   Resp 17   Ht 5' 4" (1.626 m)   Wt 93 kg (205 lb)   LMP 04/01/2022 (Within Days)   SpO2 97%   Breastfeeding No   BMI 35.19 kg/m²     "

## 2022-08-17 NOTE — PT/OT/SLP EVAL
Physical Therapy Evaluation    Patient Name:  Randee Jackson   MRN:  [id number]    Recommendations:     Discharge Recommendations:  home   Discharge Equipment Recommendations: none   Barriers to discharge: None    Assessment:     Randee Jackson is a 36 y.o. female admitted with a medical diagnosis of <principal problem not specified>.  She presents with the following impairments/functional limitations:  gait instability Patient with good use of crutches nwb right le. .    Rehab Prognosis: Good; patient would benefit from acute skilled PT services to address these deficits and reach maximum level of function.    Recent Surgery: Procedure(s) (LRB):  REMOVAL, LOOSE BODY, JOINT, ARTHROSCOPIC, ANKLE (Right) Day of Surgery    Plan:     During this hospitalization, patient to be seen 1 x/week to address the identified rehab impairments via gait training, therapeutic activities and progress toward the following goals:    · Plan of Care Expires:  08/17/22    Subjective     Chief Complaint: Awaiting sx for right ankle fx  Patient/Family Comments/goals: Patient has crutches in car. Has been using them since injury.  Pain/Comfort:  · Pain Rating 1: 3/10  · Pain Addressed 1: Cessation of Activity, Pre-medicate for activity  · Pain Addressed 2: Pre-medicate for activity    Patients cultural, spiritual, Methodist conflicts given the current situation: no    Living Environment:  Lives with family  Prior to admission, patients level of function was independent.  Equipment used at home: crutches, axillary.  DME owned (not currently used): none.  Upon discharge, patient will have assistance from mother.    Objective:     Communicated with nurse prior to session.  Patient found supine with    upon PT entry to room.    General Precautions: Standard, fall   Orthopedic Precautions:RLE non weight bearing   Braces:    Respiratory Status: Room air    Exams:  · RLE ROM: WFL  · RLE Strength: WNL  · LLE ROM: WNL  · LLE Strength:  WNL    Functional Mobility:  · Bed Mobility:     · Supine to Sit: contact guard assistance  · Sit to Supine: contact guard assistance  · Transfers:     · Sit to Stand:  contact guard assistance with axillary crutches  · Gait: ambulated 30 feet with crutches nwb right le    Therapeutic Activities and Exercises:   maintain nwb 6 weeks and/or cleared by physician    AM-PAC 6 CLICK MOBILITY  Total Score:24     Patient left supine with call button in reach.    GOALS:   Multidisciplinary Problems     Physical Therapy Goals     Not on file          Multidisciplinary Problems (Resolved)        Problem: Physical Therapy    Goal Priority Disciplines Outcome Goal Variances Interventions   Physical Therapy Goal   (Resolved)     PT, PT/OT Met     Description: Short Term Goals  Independent with HEP  Independent with crutchesx 10 feet NWB: right lower extremity    Long term goals  Needed equipment for home.                        History:     Past Medical History:   Diagnosis Date    Arthritis     Schizophrenia     Seizures        Past Surgical History:   Procedure Laterality Date     SECTION      2007    TUBAL LIGATION      by dr rodriguez       Time Tracking:     PT Received On: 22  PT Start Time: 1330     PT Stop Time: 1355  PT Total Time (min): 25 min     Billable Minutes: Evaluation 2022

## 2022-08-18 NOTE — ANESTHESIA POSTPROCEDURE EVALUATION
Anesthesia Post Evaluation    Patient: Randee Jackson    Procedure(s) Performed: Procedure(s) (LRB):  REMOVAL, LOOSE BODY, JOINT, ARTHROSCOPIC, ANKLE (Right)  MICROFRACTURE (Right)    Final Anesthesia Type: general      Patient location during evaluation: PACU  Post-procedure vital signs: reviewed and stable  Pain management: adequate  Airway patency: patent  [place] mitigation strategies: Extubation and recovery carried out in lateral, semiupright, or other nonsupine position  PONV status at discharge: No PONV  Anesthetic complications: no      Cardiovascular status: hemodynamically stable  Respiratory status: unassisted  Hydration status: euvolemic  Follow-up not needed.          Vitals Value Taken Time   /77 08/17/22 1730   Temp 36.4 °C (97.5 °F) 08/17/22 1730   Pulse 104 08/17/22 1730   Resp 18 08/17/22 1730   SpO2 96 % 08/17/22 1730         Event Time   Out of Recovery 16:39:00         Pain/Kiarra Score: Pain Rating Prior to Med Admin: 8 (8/17/2022  4:54 PM)  Kiarra Score: 9 (8/17/2022  4:40 PM)

## 2022-08-26 ENCOUNTER — OFFICE VISIT (OUTPATIENT)
Dept: ORTHOPEDICS | Facility: CLINIC | Age: 37
End: 2022-08-26
Payer: MEDICAID

## 2022-08-26 DIAGNOSIS — M25.871 ANKLE IMPINGEMENT SYNDROME, RIGHT: Primary | ICD-10-CM

## 2022-08-26 PROCEDURE — 1159F MED LIST DOCD IN RCRD: CPT | Mod: CPTII,,, | Performed by: NURSE PRACTITIONER

## 2022-08-26 PROCEDURE — 1159F PR MEDICATION LIST DOCUMENTED IN MEDICAL RECORD: ICD-10-PCS | Mod: CPTII,,, | Performed by: NURSE PRACTITIONER

## 2022-08-26 PROCEDURE — 99024 PR POST-OP FOLLOW-UP VISIT: ICD-10-PCS | Mod: ,,, | Performed by: NURSE PRACTITIONER

## 2022-08-26 PROCEDURE — 99024 POSTOP FOLLOW-UP VISIT: CPT | Mod: ,,, | Performed by: NURSE PRACTITIONER

## 2022-08-26 RX ORDER — OXYCODONE AND ACETAMINOPHEN 5; 325 MG/1; MG/1
1 TABLET ORAL EVERY 6 HOURS PRN
Qty: 30 TABLET | Refills: 0 | Status: SHIPPED | OUTPATIENT
Start: 2022-08-26 | End: 2022-09-08 | Stop reason: SDUPTHER

## 2022-08-26 NOTE — PROGRESS NOTES
HISTORY OF PRESENT ILLNESS:       No surgery found No surgery found      Pt is here today for First post-operative followup of her No surgery found.  she is doing well.  We have reviewed her findings and discussed plan of care and future treatment options, including the physical therapy plan.      Patient is here 9 days post op right ankle arthroscopy. Splint removed today, incisions look good. Steri strips applied. She is NWB. Requesting refill on pain medication.                                                                                PHYSICAL EXAMINATION:     Incision sites healed well  No evidence of any erythema, infection or induration  Minimal effusion  2+ DP pulse                                                                                   ASSESSMENT:                                                                                                                                               1. Status post above, doing well.                                                                                                                               PLAN:       1. Wounds were treated today  2. DVT prophylaxis discussed  3. Therapy plan discussed in great detail today; all questions answered.             4. Cam walking boot  5. Strict NWB  6. Percocet refill  7. RTC 4 weeks with Dr Boateng                                                                                                                                    There are no Patient Instructions on file for this visit.

## 2022-09-08 DIAGNOSIS — M25.871 ANKLE IMPINGEMENT SYNDROME, RIGHT: Primary | ICD-10-CM

## 2022-09-08 DIAGNOSIS — M25.871 ANKLE IMPINGEMENT SYNDROME, RIGHT: ICD-10-CM

## 2022-09-08 RX ORDER — OXYCODONE AND ACETAMINOPHEN 5; 325 MG/1; MG/1
1 TABLET ORAL EVERY 6 HOURS PRN
Qty: 20 TABLET | Refills: 0 | Status: SHIPPED | OUTPATIENT
Start: 2022-09-08

## 2022-09-08 RX ORDER — OXYCODONE AND ACETAMINOPHEN 5; 325 MG/1; MG/1
1 TABLET ORAL EVERY 6 HOURS PRN
Qty: 30 TABLET | Refills: 0 | Status: SHIPPED | OUTPATIENT
Start: 2022-09-08 | End: 2022-09-08 | Stop reason: SDUPTHER

## 2022-09-22 ENCOUNTER — OFFICE VISIT (OUTPATIENT)
Dept: ORTHOPEDICS | Facility: CLINIC | Age: 37
End: 2022-09-22
Payer: MEDICAID

## 2022-09-22 DIAGNOSIS — M25.871 ANKLE IMPINGEMENT SYNDROME, RIGHT: Primary | ICD-10-CM

## 2022-09-22 PROCEDURE — 99024 PR POST-OP FOLLOW-UP VISIT: ICD-10-PCS | Mod: ,,, | Performed by: ORTHOPAEDIC SURGERY

## 2022-09-22 PROCEDURE — 99024 POSTOP FOLLOW-UP VISIT: CPT | Mod: ,,, | Performed by: ORTHOPAEDIC SURGERY

## 2022-09-22 NOTE — PROGRESS NOTES
HISTORY OF PRESENT ILLNESS:       Removal, Loose Body, Joint, Arthroscopic, Ankle - Right and Microfracture - Right 8/17/2022      Pt is here today for Second post-operative followup of her Removal, Loose Body, Joint, Arthroscopic, Ankle - Right and Microfracture - Right.  she is doing well.  We have reviewed her findings and discussed plan of care and future treatment options, including the physical therapy plan.      Pt is 5 weeks post surgery, states she still have lots of pain and swelling since surgery. She is wearing her boot today but has been noncompliant.                                                                                PHYSICAL EXAMINATION:     Incision sites healed well  No evidence of any erythema, infection or induration  Minimal effusion  2+ DP pulse                                                                                   ASSESSMENT:                                                                                                                                               1. Status post above, doing well.                                                                                                                               PLAN:         Therapy plan discussed in great detail today; all questions answered.     0 going to start let her weightbear in about 1 week.  She must do this in the boot.  See back in about 4-6 weeks.                                                                                                                                          There are no Patient Instructions on file for this visit.

## 2022-12-06 ENCOUNTER — OFFICE VISIT (OUTPATIENT)
Dept: FAMILY MEDICINE | Facility: CLINIC | Age: 37
End: 2022-12-06
Payer: MEDICAID

## 2022-12-06 VITALS
BODY MASS INDEX: 35 KG/M2 | DIASTOLIC BLOOD PRESSURE: 77 MMHG | TEMPERATURE: 100 F | WEIGHT: 205 LBS | SYSTOLIC BLOOD PRESSURE: 116 MMHG | HEART RATE: 113 BPM | OXYGEN SATURATION: 99 % | HEIGHT: 64 IN

## 2022-12-06 DIAGNOSIS — L30.9 DERMATITIS: Primary | ICD-10-CM

## 2022-12-06 PROCEDURE — 3008F PR BODY MASS INDEX (BMI) DOCUMENTED: ICD-10-PCS | Mod: CPTII,,, | Performed by: NURSE PRACTITIONER

## 2022-12-06 PROCEDURE — 99213 PR OFFICE/OUTPT VISIT, EST, LEVL III, 20-29 MIN: ICD-10-PCS | Mod: ,,, | Performed by: NURSE PRACTITIONER

## 2022-12-06 PROCEDURE — 3074F PR MOST RECENT SYSTOLIC BLOOD PRESSURE < 130 MM HG: ICD-10-PCS | Mod: CPTII,,, | Performed by: NURSE PRACTITIONER

## 2022-12-06 PROCEDURE — 3078F DIAST BP <80 MM HG: CPT | Mod: CPTII,,, | Performed by: NURSE PRACTITIONER

## 2022-12-06 PROCEDURE — 99213 OFFICE O/P EST LOW 20 MIN: CPT | Mod: ,,, | Performed by: NURSE PRACTITIONER

## 2022-12-06 PROCEDURE — 3008F BODY MASS INDEX DOCD: CPT | Mod: CPTII,,, | Performed by: NURSE PRACTITIONER

## 2022-12-06 PROCEDURE — 1159F PR MEDICATION LIST DOCUMENTED IN MEDICAL RECORD: ICD-10-PCS | Mod: CPTII,,, | Performed by: NURSE PRACTITIONER

## 2022-12-06 PROCEDURE — 3074F SYST BP LT 130 MM HG: CPT | Mod: CPTII,,, | Performed by: NURSE PRACTITIONER

## 2022-12-06 PROCEDURE — 1159F MED LIST DOCD IN RCRD: CPT | Mod: CPTII,,, | Performed by: NURSE PRACTITIONER

## 2022-12-06 PROCEDURE — 3078F PR MOST RECENT DIASTOLIC BLOOD PRESSURE < 80 MM HG: ICD-10-PCS | Mod: CPTII,,, | Performed by: NURSE PRACTITIONER

## 2022-12-06 RX ORDER — DOXYCYCLINE HYCLATE 100 MG
100 TABLET ORAL 2 TIMES DAILY
Qty: 20 TABLET | Refills: 0 | Status: SHIPPED | OUTPATIENT
Start: 2022-12-06 | End: 2022-12-16

## 2022-12-06 RX ORDER — CLOTRIMAZOLE AND BETAMETHASONE DIPROPIONATE 10; .64 MG/G; MG/G
CREAM TOPICAL 2 TIMES DAILY
Qty: 45 G | Refills: 0 | Status: SHIPPED | OUTPATIENT
Start: 2022-12-06 | End: 2022-12-20

## 2022-12-06 NOTE — PROGRESS NOTES
Subjective:       Patient ID: Randee Jackson is a 37 y.o. female.    Chief Complaint: Rash (Hands & Feet x 2 Weeks)    Pt presents with rash on both feet and left hand; started 2 weeks ago.    Rash    Review of Systems   Constitutional: Negative.    HENT: Negative.     Eyes: Negative.    Respiratory: Negative.     Cardiovascular: Negative.    Gastrointestinal: Negative.    Endocrine: Negative.    Genitourinary: Negative.    Musculoskeletal: Negative.    Integumentary:  Positive for rash.   Allergic/Immunologic: Negative.    Neurological: Negative.    Hematological: Negative.    Psychiatric/Behavioral: Negative.         Objective:      Physical Exam  Constitutional:       Appearance: Normal appearance.   HENT:      Head: Normocephalic.      Right Ear: External ear normal.      Left Ear: External ear normal.      Nose: Nose normal.      Mouth/Throat:      Pharynx: Oropharynx is clear.   Eyes:      Conjunctiva/sclera: Conjunctivae normal.   Cardiovascular:      Rate and Rhythm: Normal rate.   Pulmonary:      Effort: Pulmonary effort is normal.   Abdominal:      General: Abdomen is flat.      Palpations: Abdomen is soft.   Musculoskeletal:         General: No swelling or tenderness. Normal range of motion.      Cervical back: Neck supple.      Right lower leg: No edema.      Left lower leg: No edema.   Skin:     General: Skin is warm.      Coloration: Skin is not jaundiced or pale.      Findings: Rash (bilateral medial foot with area of mild erythema with maculopapular lesions; some are scabbed over; no drainage noted; also present on left palm) present. No bruising or erythema.   Neurological:      General: No focal deficit present.      Mental Status: She is alert and oriented to person, place, and time. Mental status is at baseline.      Cranial Nerves: No cranial nerve deficit.      Sensory: No sensory deficit.      Motor: No weakness.      Gait: Gait normal.   Psychiatric:         Mood and Affect: Mood normal.          Behavior: Behavior normal.         Thought Content: Thought content normal.         Judgment: Judgment normal.       Assessment:       Problem List Items Addressed This Visit    None  Visit Diagnoses       Dermatitis    -  Primary    Relevant Medications    clotrimazole-betamethasone 1-0.05% (LOTRISONE) cream    doxycycline (VIBRA-TABS) 100 MG tablet              Plan:    Cover impetigo as well; treat as above

## 2023-03-09 ENCOUNTER — OFFICE VISIT (OUTPATIENT)
Dept: FAMILY MEDICINE | Facility: CLINIC | Age: 38
End: 2023-03-09
Payer: MEDICAID

## 2023-03-09 VITALS
HEART RATE: 120 BPM | WEIGHT: 240 LBS | DIASTOLIC BLOOD PRESSURE: 70 MMHG | OXYGEN SATURATION: 94 % | TEMPERATURE: 99 F | BODY MASS INDEX: 40.97 KG/M2 | SYSTOLIC BLOOD PRESSURE: 130 MMHG | HEIGHT: 64 IN

## 2023-03-09 DIAGNOSIS — M79.671 RIGHT FOOT PAIN: Primary | ICD-10-CM

## 2023-03-09 PROCEDURE — 99051 MED SERV EVE/WKEND/HOLIDAY: CPT | Mod: ,,, | Performed by: NURSE PRACTITIONER

## 2023-03-09 PROCEDURE — 99213 PR OFFICE/OUTPT VISIT, EST, LEVL III, 20-29 MIN: ICD-10-PCS | Mod: ,,, | Performed by: NURSE PRACTITIONER

## 2023-03-09 PROCEDURE — 3075F SYST BP GE 130 - 139MM HG: CPT | Mod: CPTII,,, | Performed by: NURSE PRACTITIONER

## 2023-03-09 PROCEDURE — 3008F PR BODY MASS INDEX (BMI) DOCUMENTED: ICD-10-PCS | Mod: CPTII,,, | Performed by: NURSE PRACTITIONER

## 2023-03-09 PROCEDURE — 3078F DIAST BP <80 MM HG: CPT | Mod: CPTII,,, | Performed by: NURSE PRACTITIONER

## 2023-03-09 PROCEDURE — 3008F BODY MASS INDEX DOCD: CPT | Mod: CPTII,,, | Performed by: NURSE PRACTITIONER

## 2023-03-09 PROCEDURE — 99051 PR MEDICAL SERVICES, EVE/WKEND/HOLIDAY: ICD-10-PCS | Mod: ,,, | Performed by: NURSE PRACTITIONER

## 2023-03-09 PROCEDURE — 1160F RVW MEDS BY RX/DR IN RCRD: CPT | Mod: CPTII,,, | Performed by: NURSE PRACTITIONER

## 2023-03-09 PROCEDURE — 1159F MED LIST DOCD IN RCRD: CPT | Mod: CPTII,,, | Performed by: NURSE PRACTITIONER

## 2023-03-09 PROCEDURE — 1159F PR MEDICATION LIST DOCUMENTED IN MEDICAL RECORD: ICD-10-PCS | Mod: CPTII,,, | Performed by: NURSE PRACTITIONER

## 2023-03-09 PROCEDURE — 3075F PR MOST RECENT SYSTOLIC BLOOD PRESS GE 130-139MM HG: ICD-10-PCS | Mod: CPTII,,, | Performed by: NURSE PRACTITIONER

## 2023-03-09 PROCEDURE — 99213 OFFICE O/P EST LOW 20 MIN: CPT | Mod: ,,, | Performed by: NURSE PRACTITIONER

## 2023-03-09 PROCEDURE — 1160F PR REVIEW ALL MEDS BY PRESCRIBER/CLIN PHARMACIST DOCUMENTED: ICD-10-PCS | Mod: CPTII,,, | Performed by: NURSE PRACTITIONER

## 2023-03-09 PROCEDURE — 3078F PR MOST RECENT DIASTOLIC BLOOD PRESSURE < 80 MM HG: ICD-10-PCS | Mod: CPTII,,, | Performed by: NURSE PRACTITIONER

## 2023-03-09 RX ORDER — IBUPROFEN 800 MG/1
800 TABLET ORAL 3 TIMES DAILY
Qty: 30 TABLET | Refills: 0 | Status: SHIPPED | OUTPATIENT
Start: 2023-03-09
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
## 2023-03-27 ENCOUNTER — HOSPITAL ENCOUNTER (OUTPATIENT)
Dept: RADIOLOGY | Facility: HOSPITAL | Age: 38
Discharge: HOME | End: 2023-03-27
Attending: ANESTHESIOLOGY
Payer: MEDICAID

## 2023-03-27 DIAGNOSIS — M46.96 LUMBAR SPONDYLITIS: ICD-10-CM

## 2023-03-27 PROCEDURE — 72114 X-RAY EXAM L-S SPINE BENDING: CPT | Mod: TC

## 2023-03-27 PROCEDURE — 72114 X-RAY EXAM L-S SPINE BENDING: CPT | Mod: 26,,, | Performed by: RADIOLOGY

## 2023-03-27 PROCEDURE — 72114 XR LUMBAR SPINE 5 VIEW WITH FLEX AND EXT: ICD-10-PCS | Mod: 26,,, | Performed by: RADIOLOGY

## 2023-04-14 ENCOUNTER — OFFICE VISIT (OUTPATIENT)
Dept: FAMILY MEDICINE | Facility: CLINIC | Age: 38
End: 2023-04-14
Payer: MEDICAID

## 2023-04-14 VITALS
SYSTOLIC BLOOD PRESSURE: 132 MMHG | BODY MASS INDEX: 40.12 KG/M2 | TEMPERATURE: 99 F | DIASTOLIC BLOOD PRESSURE: 80 MMHG | OXYGEN SATURATION: 95 % | HEIGHT: 64 IN | HEART RATE: 115 BPM | WEIGHT: 235 LBS

## 2023-04-14 DIAGNOSIS — R00.0 TACHYCARDIA WITH HEART RATE 100-120 BEATS PER MINUTE: Primary | ICD-10-CM

## 2023-04-14 LAB
EKG 12-LEAD: 111
PR INTERVAL: 152
PRT AXES: NORMAL
QRS DURATION: 78
QT/QTC: NORMAL
VENTRICULAR RATE: 116

## 2023-04-14 PROCEDURE — 93000 ELECTROCARDIOGRAM COMPLETE: CPT | Mod: ,,, | Performed by: NURSE PRACTITIONER

## 2023-04-14 PROCEDURE — 1160F PR REVIEW ALL MEDS BY PRESCRIBER/CLIN PHARMACIST DOCUMENTED: ICD-10-PCS | Mod: CPTII,,, | Performed by: NURSE PRACTITIONER

## 2023-04-14 PROCEDURE — 93000 POCT EKG 12-LEAD: ICD-10-PCS | Mod: ,,, | Performed by: NURSE PRACTITIONER

## 2023-04-14 PROCEDURE — 3079F DIAST BP 80-89 MM HG: CPT | Mod: CPTII,,, | Performed by: NURSE PRACTITIONER

## 2023-04-14 PROCEDURE — 99213 OFFICE O/P EST LOW 20 MIN: CPT | Mod: ,,, | Performed by: NURSE PRACTITIONER

## 2023-04-14 PROCEDURE — 99213 PR OFFICE/OUTPT VISIT, EST, LEVL III, 20-29 MIN: ICD-10-PCS | Mod: ,,, | Performed by: NURSE PRACTITIONER

## 2023-04-14 PROCEDURE — 1160F RVW MEDS BY RX/DR IN RCRD: CPT | Mod: CPTII,,, | Performed by: NURSE PRACTITIONER

## 2023-04-14 PROCEDURE — 1159F MED LIST DOCD IN RCRD: CPT | Mod: CPTII,,, | Performed by: NURSE PRACTITIONER

## 2023-04-14 PROCEDURE — 1159F PR MEDICATION LIST DOCUMENTED IN MEDICAL RECORD: ICD-10-PCS | Mod: CPTII,,, | Performed by: NURSE PRACTITIONER

## 2023-04-14 PROCEDURE — 3008F BODY MASS INDEX DOCD: CPT | Mod: CPTII,,, | Performed by: NURSE PRACTITIONER

## 2023-04-14 PROCEDURE — 3079F PR MOST RECENT DIASTOLIC BLOOD PRESSURE 80-89 MM HG: ICD-10-PCS | Mod: CPTII,,, | Performed by: NURSE PRACTITIONER

## 2023-04-14 PROCEDURE — 3075F SYST BP GE 130 - 139MM HG: CPT | Mod: CPTII,,, | Performed by: NURSE PRACTITIONER

## 2023-04-14 PROCEDURE — 3075F PR MOST RECENT SYSTOLIC BLOOD PRESS GE 130-139MM HG: ICD-10-PCS | Mod: CPTII,,, | Performed by: NURSE PRACTITIONER

## 2023-04-14 PROCEDURE — 3008F PR BODY MASS INDEX (BMI) DOCUMENTED: ICD-10-PCS | Mod: CPTII,,, | Performed by: NURSE PRACTITIONER

## 2023-04-14 RX ORDER — ATENOLOL 25 MG/1
25 TABLET ORAL DAILY
Qty: 30 TABLET | Refills: 0 | Status: SHIPPED | OUTPATIENT
Start: 2023-04-14 | End: 2023-05-25

## 2023-04-14 NOTE — PROGRESS NOTES
[facility]    Chief Complaint      Chief Complaint   Patient presents with    blood pressure     States that has been running high; states Wednesday was elevated at the pain clinic (thought they said 110/60, but states could be wrong)       History of Present Illness      Randee Jackson is a 37 y.o. female. She  has a past medical history of Arthritis, Schizophrenia, and Seizures., who presents today for Evaluation of her BP.  Upon further discussions with patient it is her HR she meant to tell the nurse.  Patient does smoke tobacco products and is on several medications- some for anxiety and chronic pain.     Past Medical History:  Past Medical History:   Diagnosis Date    Arthritis     Schizophrenia     Seizures        Past Surgical History:   has a past surgical history that includes  section; Tubal ligation (); and Arthroscopic removal of loose body from joint (Right, 2022).    Social History:  Social History     Tobacco Use    Smoking status: Every Day     Packs/day: 1.00     Types: Cigarettes    Smokeless tobacco: Never   Substance Use Topics    Alcohol use: Not Currently    Drug use: Yes     Types: Oxycodone       I personally reviewed all past medical, surgical, and social.     Review of Systems   Constitutional:  Negative for fatigue.   Eyes:  Negative for visual disturbance.   Respiratory:  Negative for chest tightness and shortness of breath.    Cardiovascular: Negative.    Gastrointestinal:  Negative for abdominal pain, constipation and diarrhea.   Musculoskeletal:  Negative for gait problem.   Skin: Negative.    Neurological:  Negative for dizziness, light-headedness and headaches.      Medications:  Outpatient Encounter Medications as of 2023   Medication Sig Dispense Refill    amitriptyline (ELAVIL) 100 MG tablet Take 100 mg by mouth every evening.      benztropine (COGENTIN) 0.5 MG tablet Take 0.5 mg by mouth 2 (two) times daily. as directed.      DULoxetine  "(CYMBALTA) 60 MG capsule TAKE 1 CAPSULE BY MOUTH AT BEDTIME ..MAY CAUSE DROWSINESS      FLUoxetine 40 MG capsule Take 40 mg by mouth once daily.      gabapentin (NEURONTIN) 600 MG tablet Take 600 mg by mouth 3 (three) times daily.      ibuprofen (ADVIL,MOTRIN) 800 MG tablet Take 1 tablet (800 mg total) by mouth 3 (three) times daily. 30 tablet 0    risperiDONE (RISPERDAL) 1 MG tablet Take 1 mg by mouth 2 (two) times daily. as directed.      atenoloL (TENORMIN) 25 MG tablet Take 1 tablet (25 mg total) by mouth once daily. 30 tablet 0    baclofen (LIORESAL) 10 MG tablet TAKE 1 TABLET BY MOUTH 3 TIMES A DAY AS NEEDED .. may cause drowsiness / no alcohol / no driving      brexpiprazole (REXULTI) 4 mg Tab Take 4 mg by mouth once daily.      clonazePAM (KLONOPIN) 0.5 MG tablet Take 0.5 mg by mouth 2 (two) times daily.      metroNIDAZOLE (FLAGYL) 500 MG tablet Take 1 tablet (500 mg total) by mouth 2 (two) times a day. (Patient not taking: Reported on 3/9/2023) 14 tablet 1    ondansetron (ZOFRAN-ODT) 4 MG TbDL Take 2 tablets (8 mg total) by mouth every 8 (eight) hours as needed. (Patient not taking: Reported on 3/9/2023) 30 tablet 0    oxyCODONE-acetaminophen (PERCOCET) 5-325 mg per tablet Take 1 tablet by mouth every 6 (six) hours as needed for Pain. (Patient not taking: Reported on 3/9/2023) 20 tablet 0     No facility-administered encounter medications on file as of 4/14/2023.       Allergies:  Review of patient's allergies indicates:  No Known Allergies    Health Maintenance:    There is no immunization history on file for this patient.   Health Maintenance   Topic Date Due    Hepatitis C Screening  Never done    Lipid Panel  Never done    TETANUS VACCINE  Never done        Physical Exam      Vital Signs  Temp: 99.1 °F (37.3 °C)  Temp Source: Temporal  Pulse: (!) 115  SpO2: 95 %  BP: 132/80  Height and Weight  Height: 5' 4" (162.6 cm)  Weight: 106.6 kg (235 lb)  BSA (Calculated - sq m): 2.19 sq meters  BMI (Calculated): " 40.3  Weight in (lb) to have BMI = 25: 145.3]    Physical Exam  Vitals and nursing note reviewed.   Constitutional:       Appearance: Normal appearance. She is well-developed.   HENT:      Head: Normocephalic.      Right Ear: Hearing normal.      Left Ear: Hearing normal.      Nose: Nose normal.   Eyes:      General: Lids are normal.      Conjunctiva/sclera: Conjunctivae normal.   Cardiovascular:      Rate and Rhythm: Regular rhythm. Tachycardia present.      Heart sounds: Normal heart sounds.      Comments: EKG- Sinus tachycardia at 111bpm  Pulmonary:      Effort: Pulmonary effort is normal.      Breath sounds: Normal breath sounds.   Musculoskeletal:         General: Normal range of motion.      Cervical back: Normal range of motion and neck supple.      Right lower leg: No edema.      Left lower leg: No edema.   Skin:     General: Skin is warm and dry.   Neurological:      Mental Status: She is alert and oriented to person, place, and time.      Gait: Gait is intact.   Psychiatric:         Behavior: Behavior is cooperative.        Laboratory:  CBC:      CMP:        Invalid input(s): CREATININ  LIPIDS:      TSH:      A1C:        Assessment/Plan     Randee Jackson is a 37 y.o.female with:     1. Tachycardia with heart rate 100-120 beats per minute  - POCT EKG 12-LEAD (Manually Resulted by Ordering Provider)  - atenoloL (TENORMIN) 25 MG tablet; Take 1 tablet (25 mg total) by mouth once daily.  Dispense: 30 tablet; Refill: 0       Total time spent face-to-face and non-face-to-face coordinating care for this encounter was: 20 minutes     Chronic conditions status updated as per HPI.  Other than changes above, cont current medications and maintain follow up with specialists.  Return to clinic in 1 month(s).    RICHA Alatorre  [facility]

## 2023-05-24 DIAGNOSIS — R00.0 TACHYCARDIA WITH HEART RATE 100-120 BEATS PER MINUTE: ICD-10-CM

## 2023-05-25 RX ORDER — ATENOLOL 25 MG/1
TABLET ORAL
Qty: 30 TABLET | Refills: 0 | Status: SHIPPED | OUTPATIENT
Start: 2023-05-25 | End: 2023-06-28

## 2023-06-28 DIAGNOSIS — R00.0 TACHYCARDIA WITH HEART RATE 100-120 BEATS PER MINUTE: ICD-10-CM

## 2023-06-28 RX ORDER — ATENOLOL 25 MG/1
TABLET ORAL
Qty: 30 TABLET | Refills: 0 | Status: SHIPPED | OUTPATIENT
Start: 2023-06-28 | End: 2023-07-29

## 2023-07-11 ENCOUNTER — OFFICE VISIT (OUTPATIENT)
Dept: DERMATOLOGY | Facility: CLINIC | Age: 38
End: 2023-07-11
Payer: MEDICAID

## 2023-07-11 VITALS — WEIGHT: 235 LBS | HEIGHT: 64 IN | BODY MASS INDEX: 40.12 KG/M2

## 2023-07-11 DIAGNOSIS — L30.9 DERMATITIS, UNSPECIFIED: Primary | ICD-10-CM

## 2023-07-11 LAB
BACTERIA HYPHAE, POC: NEGATIVE
YEAST, POC: NEGATIVE

## 2023-07-11 PROCEDURE — 3008F PR BODY MASS INDEX (BMI) DOCUMENTED: ICD-10-PCS | Mod: CPTII,,, | Performed by: DERMATOLOGY

## 2023-07-11 PROCEDURE — 99204 PR OFFICE/OUTPT VISIT, NEW, LEVL IV, 45-59 MIN: ICD-10-PCS | Mod: ,,, | Performed by: DERMATOLOGY

## 2023-07-11 PROCEDURE — 87220 PR  TISSUE EXAM BY KOH: ICD-10-PCS | Mod: ,,, | Performed by: DERMATOLOGY

## 2023-07-11 PROCEDURE — 1160F RVW MEDS BY RX/DR IN RCRD: CPT | Mod: CPTII,,, | Performed by: DERMATOLOGY

## 2023-07-11 PROCEDURE — 3008F BODY MASS INDEX DOCD: CPT | Mod: CPTII,,, | Performed by: DERMATOLOGY

## 2023-07-11 PROCEDURE — 87220 TISSUE EXAM FOR FUNGI: CPT | Mod: ,,, | Performed by: DERMATOLOGY

## 2023-07-11 PROCEDURE — 99204 OFFICE O/P NEW MOD 45 MIN: CPT | Mod: ,,, | Performed by: DERMATOLOGY

## 2023-07-11 PROCEDURE — 1160F PR REVIEW ALL MEDS BY PRESCRIBER/CLIN PHARMACIST DOCUMENTED: ICD-10-PCS | Mod: CPTII,,, | Performed by: DERMATOLOGY

## 2023-07-11 PROCEDURE — 1159F MED LIST DOCD IN RCRD: CPT | Mod: CPTII,,, | Performed by: DERMATOLOGY

## 2023-07-11 PROCEDURE — 1159F PR MEDICATION LIST DOCUMENTED IN MEDICAL RECORD: ICD-10-PCS | Mod: CPTII,,, | Performed by: DERMATOLOGY

## 2023-07-11 RX ORDER — CLOBETASOL PROPIONATE 0.5 MG/G
CREAM TOPICAL
Qty: 60 G | Refills: 1 | Status: SHIPPED | OUTPATIENT
Start: 2023-07-11

## 2023-07-11 NOTE — PROGRESS NOTES
[facility]   Ella Douglas MD    Patient Name: Randee Jackson  Patient YOB: 1985   Date of Service: 23    CC: Rash    HPI: Randee Jackson is a 37 y.o. female here today for rash, located on the feet and hands.  Rash has been present for 1 months.  Previous treatments include OTC fungal cream.      Past Medical History:   Diagnosis Date    Arthritis     Schizophrenia     Seizures      Past Surgical History:   Procedure Laterality Date    ARTHROSCOPIC REMOVAL OF LOOSE BODY FROM JOINT Right 2022    Procedure: REMOVAL, LOOSE BODY, JOINT, ARTHROSCOPIC, ANKLE;  Surgeon: Kilo Ambrosio MD;  Location: [facility];  Service: Orthopedics;  Laterality: Right;     SECTION      2007    TUBAL LIGATION      by dr rodriguez     Review of patient's allergies indicates:  No Known Allergies    Current Outpatient Medications:     amitriptyline (ELAVIL) 100 MG tablet, Take 100 mg by mouth every evening., Disp: , Rfl:     atenoloL (TENORMIN) 25 MG tablet, TAKE 1 TABLET BY MOUTH EVERY DAY, Disp: 30 tablet, Rfl: 0    baclofen (LIORESAL) 10 MG tablet, TAKE 1 TABLET BY MOUTH 3 TIMES A DAY AS NEEDED .. may cause drowsiness / no alcohol / no driving, Disp: , Rfl:     benztropine (COGENTIN) 0.5 MG tablet, Take 0.5 mg by mouth 2 (two) times daily. as directed., Disp: , Rfl:     brexpiprazole (REXULTI) 4 mg Tab, Take 4 mg by mouth once daily., Disp: , Rfl:     clonazePAM (KLONOPIN) 0.5 MG tablet, Take 0.5 mg by mouth 2 (two) times daily., Disp: , Rfl:     DULoxetine (CYMBALTA) 60 MG capsule, TAKE 1 CAPSULE BY MOUTH AT BEDTIME ..MAY CAUSE DROWSINESS, Disp: , Rfl:     FLUoxetine 40 MG capsule, Take 40 mg by mouth once daily., Disp: , Rfl:     gabapentin (NEURONTIN) 600 MG tablet, Take 600 mg by mouth 3 (three) times daily., Disp: , Rfl:     ibuprofen (ADVIL,MOTRIN) 800 MG tablet, Take 1 tablet (800 mg total) by mouth 3 (three) times daily., Disp: 30 tablet, Rfl: 0    risperiDONE  (RISPERDAL) 1 MG tablet, Take 1 mg by mouth 2 (two) times daily. as directed., Disp: , Rfl:     clobetasoL (TEMOVATE) 0.05 % cream, Apply to AA on hands and feet BID PRN flares tapering with improvement, Disp: 60 g, Rfl: 1    metroNIDAZOLE (FLAGYL) 500 MG tablet, Take 1 tablet (500 mg total) by mouth 2 (two) times a day. (Patient not taking: Reported on 3/9/2023), Disp: 14 tablet, Rfl: 1    ondansetron (ZOFRAN-ODT) 4 MG TbDL, Take 2 tablets (8 mg total) by mouth every 8 (eight) hours as needed. (Patient not taking: Reported on 3/9/2023), Disp: 30 tablet, Rfl: 0    oxyCODONE-acetaminophen (PERCOCET) 5-325 mg per tablet, Take 1 tablet by mouth every 6 (six) hours as needed for Pain. (Patient not taking: Reported on 3/9/2023), Disp: 20 tablet, Rfl: 0    ROS: A focused review of systems was obtained and negative.     Exam: A focused skin exam was performed. All areas examined were normal except as mentioned in the assessment and plan below.  General Appearance of the patient is well developed and well nourished.  Orientation: alert and oriented x 3.  Mood and affect: pleasant.    Assessment:   The encounter diagnosis was Dermatitis, unspecified.    Plan:   Medications Ordered This Encounter   Medications    clobetasoL (TEMOVATE) 0.05 % cream     Sig: Apply to AA on hands and feet BID PRN flares tapering with improvement     Dispense:  60 g     Refill:  1     Dermatitis Unspecified  - grouped dry lichenoid papules on the bilateral arches with similar papules on the left palm  DDx: lichenoid eruption vs eczema vs ACD, less likely tinea    Plan: Counseling  I counseled the patient regarding the following:  Skin care: Patient instructed to use gentle skin care including dove unscented soap, CeraVe moisturizing cream, and fragrance free laundry detergent.  Expectations: The patient understands that there is not a definitive diagnosis at this time. Further testing or empiric therapy may be necessary to diagnose and improve  the condition.  Contact office if: The patient develops a fever, or rash dramatically worsens despite treatment.    Plan: KOH Prep  Location: right foot  A KOH prep was ordered and evaluated from the above location. A 15-blade scalpel was used to scrape the skin. The skin scrapings were placed on a glass slide, covered with a coverslip and a KOH solution was applied. Examination of the slide showed: negative.    - will start clobetasol BID but monitor closely and consider biopsy/patch testing if the area does not improve    Follow up 3-4 week, for derm unspecified .    Ella Douglas MD

## 2023-09-01 DIAGNOSIS — R00.0 TACHYCARDIA WITH HEART RATE 100-120 BEATS PER MINUTE: ICD-10-CM

## 2023-09-01 RX ORDER — ATENOLOL 25 MG/1
TABLET ORAL
Qty: 30 TABLET | Refills: 0 | Status: SHIPPED | OUTPATIENT
Start: 2023-09-01

## 2024-01-08 ENCOUNTER — HOSPITAL ENCOUNTER (OUTPATIENT)
Dept: RADIOLOGY | Facility: HOSPITAL | Age: 39
Discharge: HOME | End: 2024-01-08
Attending: NURSE PRACTITIONER
Payer: MEDICAID

## 2024-01-08 DIAGNOSIS — M46.90: ICD-10-CM

## 2024-01-08 PROCEDURE — 72040 X-RAY EXAM NECK SPINE 2-3 VW: CPT | Mod: TC

## 2024-01-08 PROCEDURE — 72040 X-RAY EXAM NECK SPINE 2-3 VW: CPT | Mod: 26,,, | Performed by: STUDENT IN AN ORGANIZED HEALTH CARE EDUCATION/TRAINING PROGRAM

## 2024-04-09 ENCOUNTER — OFFICE VISIT (OUTPATIENT)
Dept: FAMILY MEDICINE | Facility: CLINIC | Age: 39
End: 2024-04-09
Payer: MEDICAID

## 2024-04-09 VITALS
DIASTOLIC BLOOD PRESSURE: 81 MMHG | SYSTOLIC BLOOD PRESSURE: 116 MMHG | OXYGEN SATURATION: 98 % | WEIGHT: 217 LBS | BODY MASS INDEX: 37.05 KG/M2 | HEIGHT: 64 IN | HEART RATE: 85 BPM

## 2024-04-09 DIAGNOSIS — L02.91 ABSCESS: Primary | ICD-10-CM

## 2024-04-09 PROCEDURE — 3079F DIAST BP 80-89 MM HG: CPT | Mod: CPTII,,, | Performed by: STUDENT IN AN ORGANIZED HEALTH CARE EDUCATION/TRAINING PROGRAM

## 2024-04-09 PROCEDURE — 3008F BODY MASS INDEX DOCD: CPT | Mod: CPTII,,, | Performed by: STUDENT IN AN ORGANIZED HEALTH CARE EDUCATION/TRAINING PROGRAM

## 2024-04-09 PROCEDURE — 1159F MED LIST DOCD IN RCRD: CPT | Mod: CPTII,,, | Performed by: STUDENT IN AN ORGANIZED HEALTH CARE EDUCATION/TRAINING PROGRAM

## 2024-04-09 PROCEDURE — 99213 OFFICE O/P EST LOW 20 MIN: CPT | Mod: 25,,, | Performed by: STUDENT IN AN ORGANIZED HEALTH CARE EDUCATION/TRAINING PROGRAM

## 2024-04-09 PROCEDURE — 87070 CULTURE OTHR SPECIMN AEROBIC: CPT | Mod: ,,, | Performed by: CLINICAL MEDICAL LABORATORY

## 2024-04-09 PROCEDURE — 3074F SYST BP LT 130 MM HG: CPT | Mod: CPTII,,, | Performed by: STUDENT IN AN ORGANIZED HEALTH CARE EDUCATION/TRAINING PROGRAM

## 2024-04-09 PROCEDURE — 96372 THER/PROPH/DIAG INJ SC/IM: CPT | Mod: ,,, | Performed by: STUDENT IN AN ORGANIZED HEALTH CARE EDUCATION/TRAINING PROGRAM

## 2024-04-09 RX ORDER — SULFAMETHOXAZOLE AND TRIMETHOPRIM 800; 160 MG/1; MG/1
1 TABLET ORAL 2 TIMES DAILY
Qty: 20 TABLET | Refills: 0 | Status: SHIPPED | OUTPATIENT
Start: 2024-04-09 | End: 2024-04-09

## 2024-04-09 RX ORDER — SULFAMETHOXAZOLE AND TRIMETHOPRIM 800; 160 MG/1; MG/1
1 TABLET ORAL 2 TIMES DAILY
Qty: 20 TABLET | Refills: 0 | Status: SHIPPED | OUTPATIENT
Start: 2024-04-09 | End: 2024-04-19

## 2024-04-09 RX ORDER — PALIPERIDONE PALMITATE 546 MG/1.75ML
INJECTION, SUSPENSION, EXTENDED RELEASE INTRAMUSCULAR
COMMUNITY
Start: 2024-02-26

## 2024-04-09 RX ORDER — CEFTRIAXONE 1 G/1
1 INJECTION, POWDER, FOR SOLUTION INTRAMUSCULAR; INTRAVENOUS
Status: COMPLETED | OUTPATIENT
Start: 2024-04-09 | End: 2024-04-09

## 2024-04-09 RX ORDER — KETOROLAC TROMETHAMINE 30 MG/ML
60 INJECTION, SOLUTION INTRAMUSCULAR; INTRAVENOUS
Status: COMPLETED | OUTPATIENT
Start: 2024-04-09 | End: 2024-04-09

## 2024-04-09 RX ORDER — RIZATRIPTAN BENZOATE 10 MG/1
TABLET ORAL
COMMUNITY
Start: 2024-04-04

## 2024-04-09 RX ADMIN — KETOROLAC TROMETHAMINE 60 MG: 30 INJECTION, SOLUTION INTRAMUSCULAR; INTRAVENOUS at 03:04

## 2024-04-09 RX ADMIN — CEFTRIAXONE 1 G: 1 INJECTION, POWDER, FOR SOLUTION INTRAMUSCULAR; INTRAVENOUS at 03:04

## 2024-04-09 NOTE — PATIENT INSTRUCTIONS
Apply warm compresses to the area of the boil for 15 to 30 minutes at least 4 times each day.  After the boil has opened, wash the area with soap and water 1 to 2 times each day until it is healed. Cover the area with a clean bandage.  Wash your hands before and after you touch your boil or dressing.  If you were given an antibiotic, be sure to take all of it as ordered.

## 2024-04-09 NOTE — LETTER
April 9, 2024      [facility]  [address] [place] [id number]  Phone: [phone]  Fax: [phone]       Patient: Randee Jackson   YOB: 1985  Date of Visit: 04/09/2024    To Whom It May Concern:    NIKI Jackson  was at [facility] on 04/09/2024. The patient may return to work/school on 04/10/2024 with no restrictions. If you have any questions or concerns, or if I can be of further assistance, please do not hesitate to contact me.    Sincerely,    Akanksha Walker, CMA

## 2024-04-09 NOTE — PROGRESS NOTES
[facility]    Chief Complaint      Chief Complaint   Patient presents with    Documentation Only     3-4 days she tired to drain them last night but nothing came out ( right leg )        History of Present Illness      Randee Jackson is a 38 y.o. female with chronic conditions of seziures, schizophrenia who presents today for abscess to right inner thigh.  Abscess to inner thighs, states this is the second time that this has happened.          Past Medical History:  Past Medical History:   Diagnosis Date    Arthritis     Schizophrenia     Seizures        Past Surgical History:   has a past surgical history that includes  section; Tubal ligation (); and Arthroscopic removal of loose body from joint (Right, 2022).    Social History:  Social History     Tobacco Use    Smoking status: Every Day     Current packs/day: 1.00     Types: Cigarettes    Smokeless tobacco: Never   Substance Use Topics    Alcohol use: Not Currently    Drug use: Yes     Types: Oxycodone       I personally reviewed all past medical, surgical, and social.     Review of Systems   Constitutional:  Negative for fever.   Respiratory:  Negative for shortness of breath and wheezing.    Cardiovascular:  Negative for chest pain.   Gastrointestinal:  Negative for nausea and vomiting.   Integumentary:  Positive for wound.        Medications:  Outpatient Encounter Medications as of 2024   Medication Sig Note Dispense Refill    atenoloL (TENORMIN) 25 MG tablet TAKE 1 TABLET BY MOUTH EVERY DAY  30 tablet 0    benztropine (COGENTIN) 0.5 MG tablet Take 0.5 mg by mouth 2 (two) times daily. as directed.       DULoxetine (CYMBALTA) 60 MG capsule TAKE 1 CAPSULE BY MOUTH AT BEDTIME ..MAY CAUSE DROWSINESS       gabapentin (NEURONTIN) 600 MG tablet Take 600 mg by mouth 3 (three) times daily.       INVEGA TRINZA 546 mg/1.75 mL Syrg injection Inject into the muscle.       amitriptyline (ELAVIL) 100 MG tablet Take 100 mg by mouth every  evening. (Patient not taking: Reported on 2024)       baclofen (LIORESAL) 10 MG tablet TAKE 1 TABLET BY MOUTH 3 TIMES A DAY AS NEEDED .. may cause drowsiness / no alcohol / no driving (Patient not taking: Reported on 2024)       brexpiprazole (REXULTI) 4 mg Tab Take 4 mg by mouth once daily. (Patient not taking: Reported on 2024)       clobetasoL (TEMOVATE) 0.05 % cream Apply to AA on hands and feet BID PRN flares tapering with improvement (Patient not taking: Reported on 2024)  60 g 1    clonazePAM (KLONOPIN) 0.5 MG tablet Take 0.5 mg by mouth 2 (two) times daily. (Patient not taking: Reported on 2024)       FLUoxetine 40 MG capsule Take 40 mg by mouth once daily. (Patient not taking: Reported on 2024)       ibuprofen (ADVIL,MOTRIN) 800 MG tablet Take 1 tablet (800 mg total) by mouth 3 (three) times daily. (Patient not taking: Reported on 2024)  30 tablet 0    metroNIDAZOLE (FLAGYL) 500 MG tablet Take 1 tablet (500 mg total) by mouth 2 (two) times a day. (Patient not taking: Reported on 3/9/2023)  14 tablet 1    ondansetron (ZOFRAN-ODT) 4 MG TbDL Take 2 tablets (8 mg total) by mouth every 8 (eight) hours as needed. (Patient not taking: Reported on 3/9/2023)  30 tablet 0    oxyCODONE-acetaminophen (PERCOCET) 5-325 mg per tablet Take 1 tablet by mouth every 6 (six) hours as needed for Pain. (Patient not taking: Reported on 3/9/2023)  20 tablet 0    risperiDONE (RISPERDAL) 1 MG tablet Take 1 mg by mouth 2 (two) times daily. as directed.       rizatriptan (MAXALT) 10 MG tablet SMARTSI Tablet(s) By Mouth 1-2 Times Daily (Patient not taking: Reported on 2024)       sulfamethoxazole-trimethoprim 800-160mg (BACTRIM DS) 800-160 mg Tab Take 1 tablet by mouth 2 (two) times daily. for 10 days  20 tablet 0    [DISCONTINUED] sulfamethoxazole-trimethoprim 800-160mg (BACTRIM DS) 800-160 mg Tab Take 1 tablet by mouth 2 (two) times daily. for 10 days 2024: pt now requesting a different pharmacy  "20 tablet 0     Facility-Administered Encounter Medications as of 4/9/2024   Medication Dose Route Frequency Provider Last Rate Last Admin    cefTRIAXone injection 1 g  1 g Intramuscular 1 time in Clinic/HOD Licha Damon FNP        [COMPLETED] ketorolac injection 60 mg  60 mg Intramuscular 1 time in Clinic/HOD Licha Damno FNP   60 mg at 04/09/24 1530       Allergies:  Review of patient's allergies indicates:  No Known Allergies    Health Maintenance:    There is no immunization history on file for this patient.   Health Maintenance   Topic Date Due    Hepatitis C Screening  Never done    Lipid Panel  Never done    TETANUS VACCINE  Never done        Physical Exam      Vital Signs  Pulse: 85  SpO2: 98 %  BP: 116/81  Pain Score:   8  Height and Weight  Height: 5' 4" (162.6 cm)  Weight: 98.4 kg (217 lb)  BSA (Calculated - sq m): 2.11 sq meters  BMI (Calculated): 37.2  Weight in (lb) to have BMI = 25: 145.3]    Physical Exam  Constitutional:       Appearance: Normal appearance. She is obese.   Cardiovascular:      Rate and Rhythm: Normal rate and regular rhythm.   Pulmonary:      Effort: Pulmonary effort is normal.      Breath sounds: Normal breath sounds.   Skin:     Findings: Abscess present.      Comments: Blood tinged drainage, induration noted  2 abscesses bordering each other with combined management of 3.5 X 4.5   Neurological:      General: No focal deficit present.      Mental Status: She is alert and oriented to person, place, and time. Mental status is at baseline.          Laboratory:  CBC:      CMP:        Invalid input(s): "CREATININ"  LIPIDS:      TSH:      A1C:        Assessment/Plan     Randee Jackson is a 38 y.o.female with:    1. Abscess  -     cefTRIAXone injection 1 g  -     ketorolac injection 60 mg  -     sulfamethoxazole-trimethoprim 800-160mg (BACTRIM DS) 800-160 mg Tab; Take 1 tablet by mouth 2 (two) times daily. for 10 days  Dispense: 20 tablet; Refill: 0  -     Culture, " Wound        -    supportive care as indicated in pt instructions below.        Chronic conditions status updated as per HPI.  Other than changes above, cont current medications and maintain follow up with specialists.  Return to clinic as needed.     Patient Instructions   Apply warm compresses to the area of the boil for 15 to 30 minutes at least 4 times each day.  After the boil has opened, wash the area with soap and water 1 to 2 times each day until it is healed. Cover the area with a clean bandage.  Wash your hands before and after you touch your boil or dressing.  If you were given an antibiotic, be sure to take all of it as ordered.     Licha Damon, FNP-BC  [facility]

## 2024-04-11 ENCOUNTER — TELEPHONE (OUTPATIENT)
Dept: FAMILY MEDICINE | Facility: CLINIC | Age: 39
End: 2024-04-11
Payer: MEDICAID

## 2024-04-11 LAB — MICROORGANISM SPEC CULT: NORMAL

## 2024-04-11 NOTE — TELEPHONE ENCOUNTER
----- Message from RICHA Miller sent at 4/11/2024  8:21 AM CDT -----  Please let pt know that her culture did not grown out any bacteria. Continue her antibiotics.

## 2024-04-12 ENCOUNTER — TELEPHONE (OUTPATIENT)
Dept: FAMILY MEDICINE | Facility: CLINIC | Age: 39
End: 2024-04-12
Payer: MEDICAID

## 2024-05-30 ENCOUNTER — OFFICE VISIT (OUTPATIENT)
Dept: FAMILY MEDICINE | Facility: CLINIC | Age: 39
End: 2024-05-30
Payer: MEDICAID

## 2024-05-30 VITALS
OXYGEN SATURATION: 99 % | BODY MASS INDEX: 35 KG/M2 | DIASTOLIC BLOOD PRESSURE: 84 MMHG | HEART RATE: 88 BPM | SYSTOLIC BLOOD PRESSURE: 134 MMHG | HEIGHT: 64 IN | WEIGHT: 205 LBS

## 2024-05-30 DIAGNOSIS — L02.91 ABSCESS: Primary | ICD-10-CM

## 2024-05-30 PROBLEM — Z28.21 PNEUMOCOCCAL VACCINE REFUSED: Status: ACTIVE | Noted: 2024-05-30

## 2024-05-30 PROCEDURE — 3075F SYST BP GE 130 - 139MM HG: CPT | Mod: CPTII,,, | Performed by: FAMILY MEDICINE

## 2024-05-30 PROCEDURE — 1160F RVW MEDS BY RX/DR IN RCRD: CPT | Mod: CPTII,,, | Performed by: FAMILY MEDICINE

## 2024-05-30 PROCEDURE — 99214 OFFICE O/P EST MOD 30 MIN: CPT | Mod: ,,, | Performed by: FAMILY MEDICINE

## 2024-05-30 PROCEDURE — 1159F MED LIST DOCD IN RCRD: CPT | Mod: CPTII,,, | Performed by: FAMILY MEDICINE

## 2024-05-30 PROCEDURE — 3079F DIAST BP 80-89 MM HG: CPT | Mod: CPTII,,, | Performed by: FAMILY MEDICINE

## 2024-05-30 PROCEDURE — 3008F BODY MASS INDEX DOCD: CPT | Mod: CPTII,,, | Performed by: FAMILY MEDICINE

## 2024-05-30 RX ORDER — IBUPROFEN 800 MG/1
800 TABLET ORAL 3 TIMES DAILY PRN
Qty: 30 TABLET | Refills: 0 | Status: SHIPPED | OUTPATIENT
Start: 2024-05-30

## 2024-05-30 RX ORDER — SULFAMETHOXAZOLE AND TRIMETHOPRIM 800; 160 MG/1; MG/1
1 TABLET ORAL 2 TIMES DAILY
Qty: 14 TABLET | Refills: 0 | Status: SHIPPED | OUTPATIENT
Start: 2024-05-30

## 2024-05-30 NOTE — PROGRESS NOTES
[facility]    Chief Complaint      Chief Complaint   Patient presents with    Recurrent Skin Infections       History of Present Illness      Randee Jackson is a 38 y.o. female that  has a past medical history of Arthritis, Schizophrenia, and Seizures.     HPI    The patient presents today for an abscess in her groin.  The patient states that it started a few days ago and is extremely painful.  She denies fever, chills, or drainage.  The patient states that she had an abscess on her right upper inner thigh in January.      Past Medical History:  Past Medical History:   Diagnosis Date    Arthritis     Schizophrenia     Seizures        Past Surgical History:   has a past surgical history that includes  section; Tubal ligation (); and Arthroscopic removal of loose body from joint (Right, 2022).    Social History:  Social History     Tobacco Use    Smoking status: Every Day     Current packs/day: 1.00     Types: Cigarettes    Smokeless tobacco: Never   Substance Use Topics    Alcohol use: Not Currently    Drug use: Yes     Types: Oxycodone       I personally reviewed all past medical, surgical, and social.     Review of Systems   Constitutional:  Negative for chills and fever.   HENT:  Negative for ear pain and sore throat.    Eyes:  Negative for blurred vision.   Respiratory:  Negative for cough and shortness of breath.    Cardiovascular:  Negative for chest pain and palpitations.   Gastrointestinal:  Negative for abdominal pain and constipation.   Genitourinary:  Negative for dysuria and hematuria.   Musculoskeletal:  Negative for back pain and falls.   Skin:  Negative for itching and rash (Complaining of a boil in her right groin area).   Neurological:  Negative for weakness and headaches.   Endo/Heme/Allergies:  Negative for polydipsia. Does not bruise/bleed easily.   Psychiatric/Behavioral:  Negative for suicidal ideas. The patient does not have insomnia.          Medications:  Outpatient Encounter Medications as of 5/30/2024   Medication Sig Dispense Refill    amitriptyline (ELAVIL) 100 MG tablet Take 100 mg by mouth every evening. (Patient not taking: Reported on 4/9/2024)      atenoloL (TENORMIN) 25 MG tablet TAKE 1 TABLET BY MOUTH EVERY DAY 30 tablet 0    baclofen (LIORESAL) 10 MG tablet TAKE 1 TABLET BY MOUTH 3 TIMES A DAY AS NEEDED .. may cause drowsiness / no alcohol / no driving (Patient not taking: Reported on 4/9/2024)      benztropine (COGENTIN) 0.5 MG tablet Take 0.5 mg by mouth 2 (two) times daily. as directed.      brexpiprazole (REXULTI) 4 mg Tab Take 4 mg by mouth once daily. (Patient not taking: Reported on 4/9/2024)      clobetasoL (TEMOVATE) 0.05 % cream Apply to AA on hands and feet BID PRN flares tapering with improvement (Patient not taking: Reported on 4/9/2024) 60 g 1    clonazePAM (KLONOPIN) 0.5 MG tablet Take 0.5 mg by mouth 2 (two) times daily. (Patient not taking: Reported on 4/9/2024)      DULoxetine (CYMBALTA) 60 MG capsule TAKE 1 CAPSULE BY MOUTH AT BEDTIME ..MAY CAUSE DROWSINESS      FLUoxetine 40 MG capsule Take 40 mg by mouth once daily. (Patient not taking: Reported on 4/9/2024)      gabapentin (NEURONTIN) 600 MG tablet Take 600 mg by mouth 3 (three) times daily.      ibuprofen (ADVIL,MOTRIN) 800 MG tablet Take 1 tablet (800 mg total) by mouth 3 (three) times daily as needed for Pain. 30 tablet 0    INVEGA TRINZA 546 mg/1.75 mL Syrg injection Inject into the muscle.      metroNIDAZOLE (FLAGYL) 500 MG tablet Take 1 tablet (500 mg total) by mouth 2 (two) times a day. (Patient not taking: Reported on 3/9/2023) 14 tablet 1    ondansetron (ZOFRAN-ODT) 4 MG TbDL Take 2 tablets (8 mg total) by mouth every 8 (eight) hours as needed. (Patient not taking: Reported on 3/9/2023) 30 tablet 0    oxyCODONE-acetaminophen (PERCOCET) 5-325 mg per tablet Take 1 tablet by mouth every 6 (six) hours as needed for Pain. (Patient not taking: Reported on  "3/9/2023) 20 tablet 0    risperiDONE (RISPERDAL) 1 MG tablet Take 1 mg by mouth 2 (two) times daily. as directed.      rizatriptan (MAXALT) 10 MG tablet SMARTSI Tablet(s) By Mouth 1-2 Times Daily (Patient not taking: Reported on 2024)      sulfamethoxazole-trimethoprim 800-160mg (BACTRIM DS) 800-160 mg Tab Take 1 tablet by mouth 2 (two) times daily. 14 tablet 0    [DISCONTINUED] ibuprofen (ADVIL,MOTRIN) 800 MG tablet Take 1 tablet (800 mg total) by mouth 3 (three) times daily. (Patient not taking: Reported on 2024) 30 tablet 0     No facility-administered encounter medications on file as of 2024.       Allergies:  Review of patient's allergies indicates:  No Known Allergies    Health Maintenance:    There is no immunization history on file for this patient.   Health Maintenance   Topic Date Due    Hepatitis C Screening  Never done    Lipid Panel  Never done    TETANUS VACCINE  Never done        Physical Exam      Vital Signs  Pulse: 88  SpO2: 99 %  BP: 134/84  Height and Weight  Height: 5' 4" (162.6 cm)  Weight: 93 kg (205 lb)  BSA (Calculated - sq m): 2.05 sq meters  BMI (Calculated): 35.2  Weight in (lb) to have BMI = 25: 145.3]    Physical Exam  Vitals and nursing note reviewed.   Constitutional:       Appearance: Normal appearance.   HENT:      Head: Normocephalic and atraumatic.   Cardiovascular:      Rate and Rhythm: Normal rate and regular rhythm.      Heart sounds: Normal heart sounds.   Pulmonary:      Effort: Pulmonary effort is normal.      Breath sounds: Normal breath sounds.   Genitourinary:      Musculoskeletal:      Right lower leg: No edema.   Skin:     Findings: No rash.   Neurological:      General: No focal deficit present.      Mental Status: She is alert and oriented to person, place, and time.      Gait: Gait normal.   Psychiatric:         Mood and Affect: Mood normal.         Behavior: Behavior normal.         Thought Content: Thought content normal.         Judgment: Judgment " "normal.          Laboratory:  CBC:      CMP:        Invalid input(s): "CREATININ"  LIPIDS:      TSH:      A1C:        Assessment/Plan     Randee Jackson is a 38 y.o.female with:    1. Abscess  -     sulfamethoxazole-trimethoprim 800-160mg (BACTRIM DS) 800-160 mg Tab; Take 1 tablet by mouth 2 (two) times daily.  Dispense: 14 tablet; Refill: 0  -     Cancel: Ambulatory referral/consult to General Surgery; Future; Expected date: 06/06/2024  -     ibuprofen (ADVIL,MOTRIN) 800 MG tablet; Take 1 tablet (800 mg total) by mouth 3 (three) times daily as needed for Pain.  Dispense: 30 tablet; Refill: 0  -     Ambulatory referral/consult to General Surgery; Future; Expected date: 06/06/2024        Chronic conditions status updated as per HPI.  Other than changes above, cont current medications and maintain follow up with specialists.  Return to clinic prn if symptoms worsen or fail to improve.    Rylee Lopez DO  [facility]                  "

## 2025-02-11 ENCOUNTER — TELEPHONE (OUTPATIENT)
Dept: GASTROENTEROLOGY | Facility: CLINIC | Age: 40
End: 2025-02-11
Payer: MEDICAID

## 2025-02-11 NOTE — TELEPHONE ENCOUNTER
----- Message from Nurse Kevin sent at 2/11/2025  1:41 PM CST -----  Regarding: FW: Appt. Access    ----- Message -----  From: Tess Henry  Sent: 2/11/2025  11:55 AM CST  To: [facility] Staff  Subject: Appt. Access                                     Who Called: Randee Jackson    Caller is requesting a sooner appointment. Caller declined first available appointment listed below. Caller will not accept being placed on the waitlist and is requesting a message be sent to doctor.    When is the first available appointment? March 2025  Options offered (Virtual Visit, Urgent Care): Gastro  Symptoms: spitting up blood and difficulty swallowing      Preferred Method of Contact: Phone Call  Patient's Preferred Phone Number on File: [phone]    Additional Information: Pt. Stated she would like to be seen today but someone told her they do not take her insurance. She has an appt. W/ Dr. Brock for today. I'm not sure if she can come or it needs to be rescheduled w/ Dr. Gao.    [phone]

## 2025-03-24 ENCOUNTER — OFFICE VISIT (OUTPATIENT)
Dept: FAMILY MEDICINE | Facility: CLINIC | Age: 40
End: 2025-03-24
Payer: MEDICAID

## 2025-03-24 VITALS
SYSTOLIC BLOOD PRESSURE: 158 MMHG | RESPIRATION RATE: 18 BRPM | HEART RATE: 99 BPM | HEIGHT: 64 IN | DIASTOLIC BLOOD PRESSURE: 89 MMHG | BODY MASS INDEX: 29.81 KG/M2 | OXYGEN SATURATION: 89 % | TEMPERATURE: 98 F | WEIGHT: 174.63 LBS

## 2025-03-24 DIAGNOSIS — R09.02 HYPOXIA: Primary | ICD-10-CM

## 2025-03-24 PROCEDURE — 3077F SYST BP >= 140 MM HG: CPT | Mod: CPTII,,, | Performed by: FAMILY MEDICINE

## 2025-03-24 PROCEDURE — 3008F BODY MASS INDEX DOCD: CPT | Mod: CPTII,,, | Performed by: FAMILY MEDICINE

## 2025-03-24 PROCEDURE — 1159F MED LIST DOCD IN RCRD: CPT | Mod: CPTII,,, | Performed by: FAMILY MEDICINE

## 2025-03-24 PROCEDURE — 3079F DIAST BP 80-89 MM HG: CPT | Mod: CPTII,,, | Performed by: FAMILY MEDICINE

## 2025-03-24 PROCEDURE — 99212 OFFICE O/P EST SF 10 MIN: CPT | Mod: GE,,, | Performed by: FAMILY MEDICINE

## 2025-03-24 NOTE — PROGRESS NOTES
Subjective:       Patient ID: Randee Jackson is a 39 y.o. female.    Chief Complaint: Hypertension and Shortness of Breath (Room 4 patient went to [facility] for an upper scope, patient's 02 sat never got above 89, [facility] told her to come here to establish care instead of sending her to E.R., 02 sats in room here 89 , sent to [facility]due to hypoxia, patient drove herself , report called to [facility] nurse )    Patient is a 40 yo F who presents to the clinic after being seen by GI on Friday for a procedure. She reports she was unable to get procedure done due to hypoxia. Patient reports she came in today to establish care. Patient reports having sob and is a smoker, she reports never having been diagnosed with COPD or asthma. She reports she does not have a primary care provider.     Current Medications[1]    Review of patient's allergies indicates:   Allergen Reactions    Acetaminophen-codeine Nausea And Vomiting       Past Medical History:   Diagnosis Date    Arthritis     Schizophrenia     Seizures        Past Surgical History:   Procedure Laterality Date    ARTHROSCOPIC REMOVAL OF LOOSE BODY FROM JOINT Right 2022    Procedure: REMOVAL, LOOSE BODY, JOINT, ARTHROSCOPIC, ANKLE;  Surgeon: Kilo Ambrosio MD;  Location: [facility];  Service: Orthopedics;  Laterality: Right;     SECTION      2007    TUBAL LIGATION      by dr rodriguez       Family History   Problem Relation Name Age of Onset    Lung cancer Father      Bone cancer Father      Heart disease Father      Hypertension Father      Cervical cancer Mother      Cervical cancer Sister      Stroke Paternal Grandmother      Eclampsia Maternal Grandfather         Social History[2]    Review of Systems   Constitutional:  Negative for chills and fever.   Respiratory:  Positive for apnea, cough and shortness of breath.    Cardiovascular:  Negative for chest pain and leg swelling.   Neurological:  Negative for weakness, numbness and  headaches.       Current Medications:   Medication List with Changes/Refills   Current Medications    AMITRIPTYLINE (ELAVIL) 100 MG TABLET    Take 100 mg by mouth every evening.       Start Date: --        End Date: --    ATENOLOL (TENORMIN) 25 MG TABLET    TAKE 1 TABLET BY MOUTH EVERY DAY       Start Date: 9/1/2023  End Date: --    BACLOFEN (LIORESAL) 10 MG TABLET           Start Date: 4/1/2022  End Date: --    BENZTROPINE (COGENTIN) 0.5 MG TABLET    Take 0.5 mg by mouth 2 (two) times daily. as directed.       Start Date: 1/26/2022 End Date: --    BREXPIPRAZOLE (REXULTI) 4 MG TAB    Take 4 mg by mouth once daily.       Start Date: --        End Date: --    CLOBETASOL (TEMOVATE) 0.05 % CREAM    Apply to AA on hands and feet BID PRN flares tapering with improvement       Start Date: 7/11/2023 End Date: --    CLONAZEPAM (KLONOPIN) 0.5 MG TABLET    Take 0.5 mg by mouth 2 (two) times daily.       Start Date: --        End Date: --    DULOXETINE (CYMBALTA) 60 MG CAPSULE    TAKE 1 CAPSULE BY MOUTH AT BEDTIME ..MAY CAUSE DROWSINESS       Start Date: 2/10/2022 End Date: --    FLUOXETINE 40 MG CAPSULE    Take 40 mg by mouth once daily.       Start Date: --        End Date: --    GABAPENTIN (NEURONTIN) 600 MG TABLET    Take 600 mg by mouth 3 (three) times daily.       Start Date: 3/2/2022  End Date: --    IBUPROFEN (ADVIL,MOTRIN) 800 MG TABLET    Take 1 tablet (800 mg total) by mouth 3 (three) times daily as needed for Pain.       Start Date: 5/30/2024 End Date: --    INVEGA TRINZA 546 MG/1.75 ML SYRG INJECTION    Inject into the muscle.       Start Date: 2/26/2024 End Date: --    METRONIDAZOLE (FLAGYL) 500 MG TABLET    Take 1 tablet (500 mg total) by mouth 2 (two) times a day.       Start Date: 5/20/2022 End Date: --    ONDANSETRON (ZOFRAN-ODT) 4 MG TBDL    Take 2 tablets (8 mg total) by mouth every 8 (eight) hours as needed.       Start Date: 8/17/2022 End Date: --    OXYCODONE-ACETAMINOPHEN (PERCOCET) 5-325 MG PER TABLET     Take 1 tablet by mouth every 6 (six) hours as needed for Pain.       Start Date: 9/8/2022  End Date: --    OXYCODONE-ACETAMINOPHEN (PERCOCET) 7.5-325 MG PER TABLET    Take 1 tablet by mouth 2-3 times a day as needed for pain       Start Date: 5/2/2024  End Date: --    OXYCODONE-ACETAMINOPHEN (PERCOCET) 7.5-325 MG PER TABLET    Take 1 tablet by mouth 2-3 times a day as needed for pain       Start Date: 5/2/2024  End Date: --    OXYCODONE-ACETAMINOPHEN (PERCOCET) 7.5-325 MG PER TABLET    Take 1 tablet by mouth 2-3 times a day as needed for pain... May cause drowsiness       Start Date: 7/3/2024  End Date: --    OXYCODONE-ACETAMINOPHEN (PERCOCET) 7.5-325 MG PER TABLET    Take 1 tablet by mouth 2-3 times a day as needed for pain... May cause drowsiness       Start Date: 7/3/2024  End Date: --    OXYCODONE-ACETAMINOPHEN (PERCOCET) 7.5-325 MG PER TABLET    Take one tablet by mouth 3 (three) times a day as needed for pain.. May cause drowsiness       Start Date: 9/2/2024  End Date: --    OXYCODONE-ACETAMINOPHEN (PERCOCET) 7.5-325 MG PER TABLET    Take one tablet by mouth 3 (three) times a day as needed for pain.. May cause drowsiness       Start Date: 10/2/2024 End Date: --    OXYCODONE-ACETAMINOPHEN (PERCOCET) 7.5-325 MG PER TABLET    Take one tablet by mouth 3 (three) times a day as needed for pain.. May cause drowsiness       Start Date: 10/2/2024 End Date: --    OXYCODONE-ACETAMINOPHEN (PERCOCET) 7.5-325 MG PER TABLET    Take one tablet by mouth 3 (three) times a day as needed for pain.. May cause drowsiness       Start Date: 9/2/2024  End Date: --    OXYCODONE-ACETAMINOPHEN (PERCOCET) 7.5-325 MG PER TABLET    Take one tablet by mouth 3 (three) times a day as needed for pain       Start Date: 11/29/2024End Date: --    OXYCODONE-ACETAMINOPHEN (PERCOCET) 7.5-325 MG PER TABLET    Take one tablet by mouth 3 (three) times a day as needed for pain       Start Date: 11/1/2024 End Date: --    OXYCODONE-ACETAMINOPHEN  "(PERCOCET) 7.5-325 MG PER TABLET    Take one tablet by mouth 2 (two) to 3 (three) times daily as needed for pain... May cause drowsiness       Start Date: 2024 End Date: --    OXYCODONE-ACETAMINOPHEN (PERCOCET) 7.5-325 MG PER TABLET    Take 1 tablet by mouth 2 (two) to 3 (three) times daily as needed for pain.. May cause drowsiness       Start Date: 2024End Date: --    OXYCODONE-ACETAMINOPHEN (PERCOCET) 7.5-325 MG PER TABLET    Take one tablet by mouth 2-3 times daily as needed for pain       Start Date: 2024End Date: --    OXYCODONE-ACETAMINOPHEN (PERCOCET) 7.5-325 MG PER TABLET    Take 1 tablet by mouth 2 to 3 times daily as needed for pain       Start Date: 2025 End Date: --    PANTOPRAZOLE (PROTONIX) 40 MG TABLET    Take one tablet (40 mg total) by mouth 2 (two) times daily       Start Date: 2025 End Date: --    RISPERIDONE (RISPERDAL) 1 MG TABLET    Take 1 mg by mouth 2 (two) times daily. as directed.       Start Date: 2022 End Date: --    RIZATRIPTAN (MAXALT) 10 MG TABLET    SMARTSI Tablet(s) By Mouth 1-2 Times Daily       Start Date: 2024  End Date: --    SULFAMETHOXAZOLE-TRIMETHOPRIM 800-160MG (BACTRIM DS) 800-160 MG TAB    Take 1 tablet by mouth 2 (two) times daily.       Start Date: 2024 End Date: --            Objective:        Vitals:    25 1018   BP: (!) 158/89   BP Location: Left arm   Patient Position: Sitting   Pulse: 99   Resp: 18   Temp: 97.9 °F (36.6 °C)   TempSrc: Oral   SpO2: (!) 89%   Weight: 79.2 kg (174 lb 9.6 oz)   Height: 5' 4" (1.626 m)       Physical Exam  Vitals and nursing note reviewed.   Constitutional:       Appearance: Normal appearance. She is normal weight.   HENT:      Head: Normocephalic.      Right Ear: External ear normal.      Left Ear: External ear normal.      Nose: Nose normal.      Mouth/Throat:      Pharynx: Oropharynx is clear.   Eyes:      Conjunctiva/sclera: Conjunctivae normal.   Cardiovascular:      Rate and Rhythm: " "Normal rate and regular rhythm.      Pulses: Normal pulses.      Heart sounds: Normal heart sounds.   Pulmonary:      Effort: Pulmonary effort is normal. No respiratory distress.      Breath sounds: Normal breath sounds. No stridor. No wheezing or rhonchi.   Skin:     General: Skin is warm.   Neurological:      Mental Status: She is alert and oriented to person, place, and time.   Psychiatric:         Mood and Affect: Mood normal.         Behavior: Behavior normal.         Thought Content: Thought content normal.         Judgment: Judgment normal.           No results found for: "WBC", "HGB", "HCT", "PLT", "CHOL", "TRIG", "HDL", "LDLDIRECT", "ALT", "AST", "NA", "K", "CL", "CREATININE", "BUN", "CO2", "TSH", "PSA", "INR", "GLUF", "HGBA1C", "MICROALBUR"   Assessment:       1. Hypoxia        Plan:         Problem List Items Addressed This Visit       Hypoxia - Primary    Spoke with patient regarding her hypoxia with O2 saturations in the mid 80s that she needs to go to the ED to get further evaluated. She was agreeable with this plan and states she will head over to [place] ED. Will have nurse notify the ED. Recommend that she follow back up in a week or so to establish care.               Follow up in about 1 week (around 3/31/2025).    Spencer Whatley DO     Instructed patient that if symptoms fail to improve or worsen patient should seek immediate medical attention or report to the nearest emergency department. Patient expressed verbal agreement and understanding to this plan of care.            [1]   Current Outpatient Medications:     amitriptyline (ELAVIL) 100 MG tablet, Take 100 mg by mouth every evening., Disp: , Rfl:     atenoloL (TENORMIN) 25 MG tablet, TAKE 1 TABLET BY MOUTH EVERY DAY, Disp: 30 tablet, Rfl: 0    baclofen (LIORESAL) 10 MG tablet, , Disp: , Rfl:     clobetasoL (TEMOVATE) 0.05 % cream, Apply to AA on hands and feet BID PRN flares tapering with improvement, Disp: 60 g, Rfl: 1    DULoxetine " (CYMBALTA) 60 MG capsule, TAKE 1 CAPSULE BY MOUTH AT BEDTIME ..MAY CAUSE DROWSINESS, Disp: , Rfl:     gabapentin (NEURONTIN) 600 MG tablet, Take 600 mg by mouth 3 (three) times daily., Disp: , Rfl:     ibuprofen (ADVIL,MOTRIN) 800 MG tablet, Take 1 tablet (800 mg total) by mouth 3 (three) times daily as needed for Pain., Disp: 30 tablet, Rfl: 0    oxyCODONE-acetaminophen (PERCOCET) 7.5-325 mg per tablet, Take 1 tablet by mouth 2 to 3 times daily as needed for pain, Disp: 87 tablet, Rfl: 0    pantoprazole (PROTONIX) 40 MG tablet, Take one tablet (40 mg total) by mouth 2 (two) times daily, Disp: 90 tablet, Rfl: 3    benztropine (COGENTIN) 0.5 MG tablet, Take 0.5 mg by mouth 2 (two) times daily. as directed. (Patient not taking: Reported on 3/24/2025), Disp: , Rfl:     brexpiprazole (REXULTI) 4 mg Tab, Take 4 mg by mouth once daily. (Patient not taking: Reported on 3/24/2025), Disp: , Rfl:     clonazePAM (KLONOPIN) 0.5 MG tablet, Take 0.5 mg by mouth 2 (two) times daily. (Patient not taking: Reported on 3/24/2025), Disp: , Rfl:     FLUoxetine 40 MG capsule, Take 40 mg by mouth once daily. (Patient not taking: Reported on 3/24/2025), Disp: , Rfl:     oxyCODONE-acetaminophen (PERCOCET) 7.5-325 mg per tablet, Take 1 tablet by mouth 2-3 times a day as needed for pain... May cause drowsiness (Patient not taking: Reported on 3/24/2025), Disp: 62 tablet, Rfl: 0    INVEGA TRINZA 546 mg/1.75 mL Syrg injection, Inject into the muscle. (Patient not taking: Reported on 3/24/2025), Disp: , Rfl:     metroNIDAZOLE (FLAGYL) 500 MG tablet, Take 1 tablet (500 mg total) by mouth 2 (two) times a day. (Patient not taking: Reported on 3/24/2025), Disp: 14 tablet, Rfl: 1    ondansetron (ZOFRAN-ODT) 4 MG TbDL, Take 2 tablets (8 mg total) by mouth every 8 (eight) hours as needed. (Patient not taking: Reported on 3/9/2023), Disp: 30 tablet, Rfl: 0    oxyCODONE-acetaminophen (PERCOCET) 5-325 mg per tablet, Take 1 tablet by mouth every 6 (six)  hours as needed for Pain. (Patient not taking: Reported on 3/9/2023), Disp: 20 tablet, Rfl: 0    oxyCODONE-acetaminophen (PERCOCET) 7.5-325 mg per tablet, Take 1 tablet by mouth 2-3 times a day as needed for pain (Patient not taking: Reported on 3/24/2025), Disp: 62 tablet, Rfl: 0    oxyCODONE-acetaminophen (PERCOCET) 7.5-325 mg per tablet, Take 1 tablet by mouth 2-3 times a day as needed for pain (Patient not taking: Reported on 3/24/2025), Disp: 26 tablet, Rfl: 0    oxyCODONE-acetaminophen (PERCOCET) 7.5-325 mg per tablet, Take 1 tablet by mouth 2-3 times a day as needed for pain... May cause drowsiness (Patient not taking: Reported on 3/24/2025), Disp: 26 tablet, Rfl: 0    oxyCODONE-acetaminophen (PERCOCET) 7.5-325 mg per tablet, Take one tablet by mouth 3 (three) times a day as needed for pain.. May cause drowsiness (Patient not taking: Reported on 3/24/2025), Disp: 26 tablet, Rfl: 0    oxyCODONE-acetaminophen (PERCOCET) 7.5-325 mg per tablet, Take one tablet by mouth 3 (three) times a day as needed for pain.. May cause drowsiness (Patient not taking: Reported on 3/24/2025), Disp: 62 tablet, Rfl: 0    oxyCODONE-acetaminophen (PERCOCET) 7.5-325 mg per tablet, Take one tablet by mouth 3 (three) times a day as needed for pain.. May cause drowsiness (Patient not taking: Reported on 3/24/2025), Disp: 26 tablet, Rfl: 0    oxyCODONE-acetaminophen (PERCOCET) 7.5-325 mg per tablet, Take one tablet by mouth 3 (three) times a day as needed for pain.. May cause drowsiness (Patient not taking: Reported on 3/24/2025), Disp: 62 tablet, Rfl: 0    oxyCODONE-acetaminophen (PERCOCET) 7.5-325 mg per tablet, Take one tablet by mouth 3 (three) times a day as needed for pain (Patient not taking: Reported on 3/24/2025), Disp: 62 tablet, Rfl: 0    oxyCODONE-acetaminophen (PERCOCET) 7.5-325 mg per tablet, Take one tablet by mouth 3 (three) times a day as needed for pain (Patient not taking: Reported on 3/24/2025), Disp: 62 tablet, Rfl:  0    oxyCODONE-acetaminophen (PERCOCET) 7.5-325 mg per tablet, Take one tablet by mouth 2 (two) to 3 (three) times daily as needed for pain... May cause drowsiness (Patient not taking: Reported on 3/24/2025), Disp: 87 tablet, Rfl: 0    oxyCODONE-acetaminophen (PERCOCET) 7.5-325 mg per tablet, Take 1 tablet by mouth 2 (two) to 3 (three) times daily as needed for pain.. May cause drowsiness (Patient not taking: Reported on 3/24/2025), Disp: 87 tablet, Rfl: 0    oxyCODONE-acetaminophen (PERCOCET) 7.5-325 mg per tablet, Take one tablet by mouth 2-3 times daily as needed for pain (Patient not taking: Reported on 3/24/2025), Disp: 87 tablet, Rfl: 0    risperiDONE (RISPERDAL) 1 MG tablet, Take 1 mg by mouth 2 (two) times daily. as directed. (Patient not taking: Reported on 3/24/2025), Disp: , Rfl:     rizatriptan (MAXALT) 10 MG tablet, SMARTSI Tablet(s) By Mouth 1-2 Times Daily (Patient not taking: Reported on 3/24/2025), Disp: , Rfl:     sulfamethoxazole-trimethoprim 800-160mg (BACTRIM DS) 800-160 mg Tab, Take 1 tablet by mouth 2 (two) times daily. (Patient not taking: Reported on 3/24/2025), Disp: 14 tablet, Rfl: 0  [2]   Social History  Tobacco Use    Smoking status: Every Day     Current packs/day: 1.00     Types: Cigarettes    Smokeless tobacco: Never   Substance Use Topics    Alcohol use: Not Currently    Drug use: Yes     Types: Oxycodone

## 2025-03-24 NOTE — ASSESSMENT & PLAN NOTE
Spoke with patient regarding her hypoxia with O2 saturations in the mid 80s that she needs to go to the ED to get further evaluated. She was agreeable with this plan and states she will head over to [place] ED. Will have nurse notify the ED. Recommend that she follow back up in a week or so to establish care.

## (undated) DEVICE — DRESSING XEROFORM FOIL PK 1X8

## (undated) DEVICE — GLOVE BIOGEL SKINSENSE PI 7.5

## (undated) DEVICE — DRAPE ARTHSCP T ORTHOMAX POUCH

## (undated) DEVICE — SUT ETHILON 3-0 PS2 18 BLK

## (undated) DEVICE — GLOVE BIOGEL SKINSENSE PI 7.0

## (undated) DEVICE — PACK KNEE ARTHROSCOPY  RUSH

## (undated) DEVICE — TUBING CROSSFLOW INFLOW CASS

## (undated) DEVICE — GLOVE BIOGEL SKINSENSE PI 6.5

## (undated) DEVICE — Device

## (undated) DEVICE — DEVICE SUCTION H20 BROOM 12FT

## (undated) DEVICE — GLOVE BIOGEL SKINSENSE PI 8.0

## (undated) DEVICE — CANISTER SUCTION MEDI-VAC 12L

## (undated) DEVICE — DRAPE INVISISHIELD U 48X52IN

## (undated) DEVICE — COVER PROXIMA MAYO STAND

## (undated) DEVICE — BANDAGE PRCAR COMPR 11YDX6IN

## (undated) DEVICE — GOWN IMPERVIOUS BLUE XXL

## (undated) DEVICE — NDL HYPODERMIC SAFETY 25G 1IN

## (undated) DEVICE — STRAP ANKLE DISTRACTOR FOOT

## (undated) DEVICE — PAD ABDOMINAL 8X7.5 STERILE

## (undated) DEVICE — SOL NACL IRR 3000ML

## (undated) DEVICE — TUBE SUCTION MEDI-VAC STERILE

## (undated) DEVICE — SYR 10CC LUER LOCK

## (undated) DEVICE — NANOSCOPE HANDPIECE

## (undated) DEVICE — SPONGE COTTON WOVEN 4X4IN

## (undated) DEVICE — TOURNIQUET SB QC SP 34X4IN

## (undated) DEVICE — APPLICATOR CHLORAPREP ORN 26ML